# Patient Record
Sex: MALE | Race: WHITE | Employment: OTHER | URBAN - METROPOLITAN AREA
[De-identification: names, ages, dates, MRNs, and addresses within clinical notes are randomized per-mention and may not be internally consistent; named-entity substitution may affect disease eponyms.]

---

## 2018-10-15 ENCOUNTER — OFFICE VISIT (OUTPATIENT)
Dept: INTERNAL MEDICINE CLINIC | Age: 58
End: 2018-10-15
Payer: COMMERCIAL

## 2018-10-15 VITALS
WEIGHT: 198 LBS | DIASTOLIC BLOOD PRESSURE: 66 MMHG | BODY MASS INDEX: 26.82 KG/M2 | HEART RATE: 86 BPM | OXYGEN SATURATION: 100 % | SYSTOLIC BLOOD PRESSURE: 118 MMHG | RESPIRATION RATE: 16 BRPM | TEMPERATURE: 98.1 F | HEIGHT: 72 IN

## 2018-10-15 DIAGNOSIS — F17.200 SMOKER: ICD-10-CM

## 2018-10-15 DIAGNOSIS — Z00.00 HEALTH CARE MAINTENANCE: Primary | ICD-10-CM

## 2018-10-15 DIAGNOSIS — Z80.0 FHX: COLON CANCER: ICD-10-CM

## 2018-10-15 DIAGNOSIS — D23.10 PAPILLOMA OF LEFT EYELID: ICD-10-CM

## 2018-10-15 PROCEDURE — 99204 OFFICE O/P NEW MOD 45 MIN: CPT | Performed by: INTERNAL MEDICINE

## 2018-10-15 PROCEDURE — G8484 FLU IMMUNIZE NO ADMIN: HCPCS | Performed by: INTERNAL MEDICINE

## 2018-10-15 PROCEDURE — 3017F COLORECTAL CA SCREEN DOC REV: CPT | Performed by: INTERNAL MEDICINE

## 2018-10-15 PROCEDURE — G8427 DOCREV CUR MEDS BY ELIG CLIN: HCPCS | Performed by: INTERNAL MEDICINE

## 2018-10-15 PROCEDURE — 4004F PT TOBACCO SCREEN RCVD TLK: CPT | Performed by: INTERNAL MEDICINE

## 2018-10-15 PROCEDURE — G8419 CALC BMI OUT NRM PARAM NOF/U: HCPCS | Performed by: INTERNAL MEDICINE

## 2018-10-15 RX ORDER — VARENICLINE TARTRATE 25 MG
KIT ORAL
Qty: 1 EACH | Refills: 1 | Status: SHIPPED | OUTPATIENT
Start: 2018-10-15 | End: 2021-09-29

## 2018-10-15 RX ORDER — VARENICLINE TARTRATE 1 MG/1
1 TABLET, FILM COATED ORAL 2 TIMES DAILY
Qty: 60 TABLET | Refills: 3 | Status: SHIPPED | OUTPATIENT
Start: 2018-11-15 | End: 2019-04-24 | Stop reason: SDUPTHER

## 2018-10-15 ASSESSMENT — ENCOUNTER SYMPTOMS
BACK PAIN: 0
CONSTIPATION: 0
FACIAL SWELLING: 0
WHEEZING: 0
COUGH: 0
ABDOMINAL DISTENTION: 0
COLOR CHANGE: 0
CHEST TIGHTNESS: 0
APNEA: 0
SHORTNESS OF BREATH: 0
DIARRHEA: 0
ABDOMINAL PAIN: 0

## 2018-10-15 ASSESSMENT — PATIENT HEALTH QUESTIONNAIRE - PHQ9
SUM OF ALL RESPONSES TO PHQ QUESTIONS 1-9: 0
1. LITTLE INTEREST OR PLEASURE IN DOING THINGS: 0
2. FEELING DOWN, DEPRESSED OR HOPELESS: 0
SUM OF ALL RESPONSES TO PHQ9 QUESTIONS 1 & 2: 0
SUM OF ALL RESPONSES TO PHQ QUESTIONS 1-9: 0

## 2018-12-18 ENCOUNTER — OFFICE VISIT (OUTPATIENT)
Dept: INTERNAL MEDICINE CLINIC | Age: 58
End: 2018-12-18
Payer: COMMERCIAL

## 2018-12-18 VITALS
BODY MASS INDEX: 28.31 KG/M2 | HEIGHT: 72 IN | DIASTOLIC BLOOD PRESSURE: 70 MMHG | SYSTOLIC BLOOD PRESSURE: 130 MMHG | WEIGHT: 209 LBS

## 2018-12-18 DIAGNOSIS — F17.200 SMOKER: Primary | ICD-10-CM

## 2018-12-18 PROCEDURE — 99213 OFFICE O/P EST LOW 20 MIN: CPT | Performed by: INTERNAL MEDICINE

## 2018-12-18 PROCEDURE — G8419 CALC BMI OUT NRM PARAM NOF/U: HCPCS | Performed by: INTERNAL MEDICINE

## 2018-12-18 PROCEDURE — G8427 DOCREV CUR MEDS BY ELIG CLIN: HCPCS | Performed by: INTERNAL MEDICINE

## 2018-12-18 PROCEDURE — 3017F COLORECTAL CA SCREEN DOC REV: CPT | Performed by: INTERNAL MEDICINE

## 2018-12-18 PROCEDURE — 4004F PT TOBACCO SCREEN RCVD TLK: CPT | Performed by: INTERNAL MEDICINE

## 2018-12-18 PROCEDURE — G8484 FLU IMMUNIZE NO ADMIN: HCPCS | Performed by: INTERNAL MEDICINE

## 2018-12-18 NOTE — PROGRESS NOTES
Constitutional: Negative for activity change, appetite change, chills and diaphoresis. HENT: Negative for congestion, dental problem, ear discharge, facial swelling and hearing loss. Respiratory: Negative for apnea, cough, chest tightness, shortness of breath and wheezing. Cardiovascular: Negative for chest pain and leg swelling. Gastrointestinal: Negative for abdominal distention, abdominal pain, constipation and diarrhea. Genitourinary: Negative for difficulty urinating, dysuria, enuresis, flank pain and frequency. Musculoskeletal: Negative for arthralgias, back pain, gait problem and joint swelling. Skin: Negative for color change, pallor and rash. Mobile Swelling in the upper eyelid of left eye   Neurological: Negative for dizziness, seizures, facial asymmetry, light-headedness, numbness and headaches. Psychiatric/Behavioral: Negative for agitation, behavioral problems, confusion, decreased concentration and dysphoric mood. Objective:   Physical Exam   Constitutional: He is oriented to person, place, and time. He appears well-developed and well-nourished. HENT:   Head: Normocephalic and atraumatic. Mouth/Throat: No oropharyngeal exudate. Eyes: Pupils are equal, round, and reactive to light. Conjunctivae are normal. Right eye exhibits no discharge. Left eye exhibits no discharge. No scleral icterus. Papilloma present in the upper eyelid of left eye   Neck: Normal range of motion. Neck supple. No JVD present. No tracheal deviation present. No thyromegaly present. Cardiovascular: Normal rate and normal heart sounds. Exam reveals no gallop. No murmur heard. Pulmonary/Chest: Effort normal and breath sounds normal. No stridor. No respiratory distress. He has no wheezes. He has no rales. He exhibits no tenderness. Abdominal: Soft. Bowel sounds are normal. He exhibits no distension. There is no tenderness. There is no rebound and no guarding.    Musculoskeletal: Normal

## 2018-12-29 ASSESSMENT — ENCOUNTER SYMPTOMS
BACK PAIN: 0
APNEA: 0
ABDOMINAL PAIN: 0
FACIAL SWELLING: 0
ABDOMINAL DISTENTION: 0
CHEST TIGHTNESS: 0
COLOR CHANGE: 0
WHEEZING: 0
SHORTNESS OF BREATH: 0
CONSTIPATION: 0
DIARRHEA: 0
COUGH: 0

## 2019-04-24 DIAGNOSIS — F17.200 SMOKER: ICD-10-CM

## 2019-04-25 RX ORDER — VARENICLINE TARTRATE 1 MG/1
TABLET, FILM COATED ORAL
Qty: 60 TABLET | Refills: 2 | Status: SHIPPED | OUTPATIENT
Start: 2019-04-25 | End: 2021-09-29

## 2021-07-24 ENCOUNTER — APPOINTMENT (OUTPATIENT)
Dept: GENERAL RADIOLOGY | Age: 61
End: 2021-07-24
Payer: COMMERCIAL

## 2021-07-24 ENCOUNTER — HOSPITAL ENCOUNTER (EMERGENCY)
Age: 61
Discharge: HOME | End: 2021-07-24
Attending: EMERGENCY MEDICINE
Payer: COMMERCIAL

## 2021-07-24 VITALS
TEMPERATURE: 96.7 F | HEART RATE: 83 BPM | SYSTOLIC BLOOD PRESSURE: 152 MMHG | RESPIRATION RATE: 18 BRPM | HEIGHT: 72 IN | WEIGHT: 195 LBS | DIASTOLIC BLOOD PRESSURE: 75 MMHG | OXYGEN SATURATION: 97 % | BODY MASS INDEX: 26.41 KG/M2

## 2021-07-24 DIAGNOSIS — M25.551 RIGHT HIP PAIN: Primary | ICD-10-CM

## 2021-07-24 DIAGNOSIS — M54.31 SCIATICA OF RIGHT SIDE: ICD-10-CM

## 2021-07-24 PROCEDURE — 72100 X-RAY EXAM L-S SPINE 2/3 VWS: CPT

## 2021-07-24 PROCEDURE — 73502 X-RAY EXAM HIP UNI 2-3 VIEWS: CPT

## 2021-07-24 PROCEDURE — 6370000000 HC RX 637 (ALT 250 FOR IP): Performed by: EMERGENCY MEDICINE

## 2021-07-24 PROCEDURE — 99284 EMERGENCY DEPT VISIT MOD MDM: CPT

## 2021-07-24 RX ORDER — LIDOCAINE 50 MG/G
1 PATCH TOPICAL DAILY
Qty: 10 PATCH | Refills: 0 | Status: SHIPPED | OUTPATIENT
Start: 2021-07-24 | End: 2021-09-29

## 2021-07-24 RX ORDER — NAPROXEN 500 MG/1
500 TABLET ORAL 2 TIMES DAILY
Qty: 20 TABLET | Refills: 0 | Status: SHIPPED | OUTPATIENT
Start: 2021-07-24 | End: 2021-09-29

## 2021-07-24 RX ORDER — NAPROXEN 250 MG/1
500 TABLET ORAL ONCE
Status: COMPLETED | OUTPATIENT
Start: 2021-07-24 | End: 2021-07-24

## 2021-07-24 RX ORDER — CYCLOBENZAPRINE HCL 10 MG
10 TABLET ORAL 3 TIMES DAILY PRN
Qty: 30 TABLET | Refills: 0 | Status: SHIPPED | OUTPATIENT
Start: 2021-07-24 | End: 2021-08-03

## 2021-07-24 RX ORDER — ACETAMINOPHEN 500 MG
1000 TABLET ORAL EVERY 6 HOURS PRN
Qty: 60 TABLET | Refills: 0 | Status: SHIPPED | OUTPATIENT
Start: 2021-07-24 | End: 2021-10-19 | Stop reason: ALTCHOICE

## 2021-07-24 RX ORDER — ACETAMINOPHEN 500 MG
1000 TABLET ORAL ONCE
Status: COMPLETED | OUTPATIENT
Start: 2021-07-24 | End: 2021-07-24

## 2021-07-24 RX ORDER — LIDOCAINE 4 G/G
1 PATCH TOPICAL ONCE
Status: DISCONTINUED | OUTPATIENT
Start: 2021-07-24 | End: 2021-07-24 | Stop reason: HOSPADM

## 2021-07-24 RX ADMIN — NAPROXEN 500 MG: 250 TABLET ORAL at 13:23

## 2021-07-24 RX ADMIN — ACETAMINOPHEN 1000 MG: 500 TABLET ORAL at 13:23

## 2021-07-24 ASSESSMENT — ENCOUNTER SYMPTOMS
ABDOMINAL PAIN: 0
BACK PAIN: 1
BOWEL INCONTINENCE: 0

## 2021-07-24 ASSESSMENT — PAIN SCALES - GENERAL
PAINLEVEL_OUTOF10: 8
PAINLEVEL_OUTOF10: 8

## 2021-07-24 NOTE — ED NOTES
Pt given instructions for follow-up and discharge. Pt given education on prescriptions. Pt verbalizes understanding. Pt is A&O x4, PWD, eupneic, and ambulatory with steady, even gait upon discharge.       Giovana Pineda RN  07/24/21 1158

## 2021-07-24 NOTE — ED PROVIDER NOTES
EMERGENCY DEPARTMENT ENCOUNTER    Pt Name: Naun Briscoe  MRN: [id number]  [place] 1960  Date of evaluation: 7/24/21  CHIEF COMPLAINT       Chief Complaint   Patient presents with    Hip Pain     right     HISTORY OF PRESENT ILLNESS     Back Pain  Pain location: right low back, right hip, radiates down back of leg. Quality:  Aching  Radiates to:  R posterior upper leg and R thigh  Pain severity:  Severe  Onset quality:  Gradual  Duration:  3 days  Timing:  Constant  Progression:  Unchanged  Chronicity:  New  Context comment:  Does paint and wallpaper for work, his own business  Relieved by: standing helps. Worsened by:  Nothing  Ineffective treatments: motrin, [place]. Associated symptoms: no abdominal pain, no bladder incontinence, no bowel incontinence, no fever, no numbness, no paresthesias, no tingling, no weakness and no weight loss            REVIEW OF SYSTEMS     Review of Systems   Constitutional: Negative for fever and weight loss. Gastrointestinal: Negative for abdominal pain and bowel incontinence. Genitourinary: Negative for bladder incontinence. Musculoskeletal: Positive for back pain. Neurological: Negative for tingling, weakness, numbness and paresthesias. All other systems reviewed and are negative. PASTMEDICAL HISTORY   History reviewed. No pertinent past medical history.   Past Problem List  Patient Active Problem List   Diagnosis Code    Smoker F17.200    FHx: colon cancer Z80.0     SURGICAL HISTORY       Past Surgical History:   Procedure Laterality Date    HERNIA REPAIR      TONSILLECTOMY       CURRENT MEDICATIONS       Discharge Medication List as of 7/24/2021  1:45 PM      CONTINUE these medications which have NOT CHANGED    Details   CHANTIX 1 MG tablet TAKE ONE TABLET BY MOUTH TWICE A DAY, Disp-60 tablet, R-2Normal      varenicline (CHANTIX STARTING MONTH PAK) 0.5 MG X 11 & 1 MG X 42 tablet Take by mouth., Disp-1 each, R-1Normal           ALLERGIES     has No Known Allergies. FAMILY HISTORY     He indicated that his mother is . He indicated that his father is . He indicated that the status of his maternal uncle is unknown. SOCIAL HISTORY       Social History     Tobacco Use    Smoking status: Current Every Day Smoker     Packs/day: 1.50     Years: 30.00     Pack years: 45.00     Types: Cigarettes    Smokeless tobacco: Never Used   Vaping Use    Vaping Use: Never used   Substance Use Topics    Alcohol use: Yes     Alcohol/week: 1.0 standard drinks     Types: 1 Cans of beer per week     Comment: 1 daily     Drug use: No     PHYSICAL EXAM     INITIAL VITALS: BP (!) 152/75   Pulse 83   Temp 96.7 °F (35.9 °C) (Temporal)   Resp 18   Ht 6' (1.829 m)   Wt 195 lb (88.5 kg)   SpO2 97%   BMI 26.45 kg/m²    Physical Exam  Vitals reviewed. Constitutional:       General: He is not in acute distress. Appearance: Normal appearance. He is well-developed. He is not diaphoretic. HENT:      Head: Normocephalic and atraumatic. Right Ear: External ear normal.      Left Ear: External ear normal.      Nose: Nose normal. No congestion. Mouth/Throat:      Mouth: Mucous membranes are moist.      Pharynx: Oropharynx is clear. Eyes:      General:         Right eye: No discharge. Left eye: No discharge. Conjunctiva/sclera: Conjunctivae normal.      Pupils: Pupils are equal, round, and reactive to light. Neck:      Trachea: No tracheal deviation. Cardiovascular:      Rate and Rhythm: Normal rate and regular rhythm. Pulses: Normal pulses. Heart sounds: Normal heart sounds. Pulmonary:      Effort: Pulmonary effort is normal. No respiratory distress. Breath sounds: Normal breath sounds. No stridor. No wheezing or rales. Abdominal:      Palpations: Abdomen is soft. Tenderness: There is no abdominal tenderness. There is no guarding or rebound. Musculoskeletal:         General: No tenderness or deformity.  Normal range of motion. Cervical back: Normal range of motion and neck supple. Comments: No midline t or l spine tenderness  Full rom in right hip and right knee  No erythema or edema or warmth to right hip or right knee   Skin:     General: Skin is warm and dry. Capillary Refill: Capillary refill takes less than 2 seconds. Findings: No erythema or rash. Neurological:      General: No focal deficit present. Mental Status: He is alert and oriented to person, place, and time. Cranial Nerves: No cranial nerve deficit. Coordination: Coordination normal.      Comments: GCS 15  Strength in arms 5/5  Strength in legs 5/5  Sensation intact bl arms and legs  No facial droop  Speech is clear, no dysarthria or aphasia  No ataxia in arms or legs  No gaze preference or nystagums  Normal gait in the ED     Psychiatric:         Mood and Affect: Mood normal.         Behavior: Behavior normal.         Thought Content: Thought content normal.         Judgment: Judgment normal.         MEDICAL DECISION MAKING:   Reviewed xrays  He is ambulatory  Feeling better  Do not suspect cord compression or infection  Discussed with patient anticipatory guidance, discharge instructions, follow up PCP 24 hours  Do not suspect septic hip           Procedures    DIAGNOSTIC RESULTS     RADIOLOGY:All plain film, CT, MRI, and formal ultrasound images (except ED bedside ultrasound) are read by the radiologist, see reports below, unless otherwisenoted in MDM or here. XR LUMBAR SPINE (2-3 VIEWS)   Final Result   Multilevel degenerative disc disease in the mid and lower lumbar spine and   lower lumbar facet arthropathy. XR HIP 2-3 VW W PELVIS RIGHT   Final Result   Mild degenerative changes. No acute osseous abnormality.            EMERGENCY DEPARTMENTCOURSE:         Vitals:    Vitals:    07/24/21 1215   BP: (!) 152/75   Pulse: 83   Resp: 18   Temp: 96.7 °F (35.9 °C)   TempSrc: Temporal   SpO2: 97%   Weight: 195 lb (88.5 kg)   Height: 6' (1.829 m)       The patient was given the following medications while in the emergency department:  Orders Placed This Encounter   Medications    acetaminophen (TYLENOL) tablet 1,000 mg    DISCONTD: lidocaine 4 % external patch 1 patch    naproxen (NAPROSYN) tablet 500 mg    acetaminophen (TYLENOL) 500 MG tablet     Sig: Take 2 tablets by mouth every 6 hours as needed for Pain     Dispense:  60 tablet     Refill:  0    naproxen (NAPROSYN) 500 MG tablet     Sig: Take 1 tablet by mouth 2 times daily     Dispense:  20 tablet     Refill:  0    cyclobenzaprine (FLEXERIL) 10 MG tablet     Sig: Take 1 tablet by mouth 3 times daily as needed for Muscle spasms     Dispense:  30 tablet     Refill:  0    lidocaine (LIDODERM) 5 %     Sig: Place 1 patch onto the skin daily 12 hours on, 12 hours off. Dispense:  10 patch     Refill:  0       FINAL IMPRESSION      1. Right hip pain    2.  Sciatica of right side          DISPOSITION/PLAN   DISPOSITION Decision To Discharge 07/24/2021 01:44:59 PM      PATIENT REFERRED TO:  Kirsten Hollins MD  [address]  [phone]          DISCHARGE MEDICATIONS:  Discharge Medication List as of 7/24/2021  1:45 PM      START taking these medications    Details   acetaminophen (TYLENOL) 500 MG tablet Take 2 tablets by mouth every 6 hours as needed for Pain, Disp-60 tablet, R-0Normal      naproxen (NAPROSYN) 500 MG tablet Take 1 tablet by mouth 2 times daily, Disp-20 tablet, R-0Normal      cyclobenzaprine (FLEXERIL) 10 MG tablet Take 1 tablet by mouth 3 times daily as needed for Muscle spasms, Disp-30 tablet, R-0Normal      lidocaine (LIDODERM) 5 % Place 1 patch onto the skin daily 12 hours on, 12 hours off., Disp-10 patch, R-0Normal           Kingsley Childers MD  Attending Emergency Physician                   Kingsley Childers MD  07/24/21 9345

## 2021-07-27 ENCOUNTER — TELEPHONE (OUTPATIENT)
Dept: INTERNAL MEDICINE CLINIC | Age: 61
End: 2021-07-27

## 2021-07-27 NOTE — TELEPHONE ENCOUNTER
----- Message from Valerio Baltazargilda sent at 7/27/2021 12:49 PM EDT -----  Subject: Appointment Request    Reason for Call: Routine ED Follow Up Visit    QUESTIONS  Type of Appointment? Established Patient  Reason for appointment request? Available appointments did not meet   patient need  Additional Information for Provider? Was In ER 7/24 with hip pain still   having pain and travel to [place] Can he please have a soon appointment   ---------------------------------------------------------------------------  --------------  [employer]  What is the best way for the office to contact you? OK to leave message on   voicemail  Preferred Call Back Phone Number? [phone]  ---------------------------------------------------------------------------  --------------  SCRIPT ANSWERS  Relationship to Patient? Self  (Patient requests to see provider urgently. )? No  Do you have any questions for your primary care provider that need to be   answered prior to your appointment? No  Have you been diagnosed with, awaiting test results for, or told that you   are suspected of having COVID-19 (Coronavirus)? (If patient has tested   negative or was tested as a requirement for work, school, or travel and   not based on symptoms, answer no)? No  Do you currently have flu-like symptoms including fever or chills, cough,   shortness of breath, difficulty breathing, or new loss of taste or smell? No  Have you had close contact with someone with COVID-19 in the last 14 days? No  (Service Expert  click yes below to proceed with [employer] Usual   Scheduling)?  Yes

## 2021-07-28 ENCOUNTER — OFFICE VISIT (OUTPATIENT)
Dept: INTERNAL MEDICINE CLINIC | Age: 61
End: 2021-07-28
Payer: COMMERCIAL

## 2021-07-28 VITALS
HEIGHT: 72 IN | SYSTOLIC BLOOD PRESSURE: 136 MMHG | WEIGHT: 195 LBS | DIASTOLIC BLOOD PRESSURE: 84 MMHG | BODY MASS INDEX: 26.41 KG/M2

## 2021-07-28 DIAGNOSIS — H61.23 BILATERAL IMPACTED CERUMEN: ICD-10-CM

## 2021-07-28 DIAGNOSIS — Z13.220 SCREENING FOR HYPERLIPIDEMIA: ICD-10-CM

## 2021-07-28 DIAGNOSIS — M25.551 RIGHT HIP PAIN: Primary | ICD-10-CM

## 2021-07-28 DIAGNOSIS — Z13.1 ENCOUNTER FOR SCREENING FOR DIABETES MELLITUS: ICD-10-CM

## 2021-07-28 PROCEDURE — G8427 DOCREV CUR MEDS BY ELIG CLIN: HCPCS | Performed by: INTERNAL MEDICINE

## 2021-07-28 PROCEDURE — G8419 CALC BMI OUT NRM PARAM NOF/U: HCPCS | Performed by: INTERNAL MEDICINE

## 2021-07-28 PROCEDURE — 4004F PT TOBACCO SCREEN RCVD TLK: CPT | Performed by: INTERNAL MEDICINE

## 2021-07-28 PROCEDURE — 99214 OFFICE O/P EST MOD 30 MIN: CPT | Performed by: INTERNAL MEDICINE

## 2021-07-28 PROCEDURE — 3017F COLORECTAL CA SCREEN DOC REV: CPT | Performed by: INTERNAL MEDICINE

## 2021-07-28 RX ORDER — HYDROCODONE BITARTRATE AND ACETAMINOPHEN 5; 325 MG/1; MG/1
1 TABLET ORAL EVERY 6 HOURS PRN
Qty: 20 TABLET | Refills: 0 | Status: SHIPPED | OUTPATIENT
Start: 2021-07-28 | End: 2021-08-02

## 2021-07-28 SDOH — ECONOMIC STABILITY: FOOD INSECURITY: WITHIN THE PAST 12 MONTHS, THE FOOD YOU BOUGHT JUST DIDN'T LAST AND YOU DIDN'T HAVE MONEY TO GET MORE.: NEVER TRUE

## 2021-07-28 SDOH — ECONOMIC STABILITY: FOOD INSECURITY: WITHIN THE PAST 12 MONTHS, YOU WORRIED THAT YOUR FOOD WOULD RUN OUT BEFORE YOU GOT MONEY TO BUY MORE.: NEVER TRUE

## 2021-07-28 ASSESSMENT — PATIENT HEALTH QUESTIONNAIRE - PHQ9
2. FEELING DOWN, DEPRESSED OR HOPELESS: 0
1. LITTLE INTEREST OR PLEASURE IN DOING THINGS: 0
SUM OF ALL RESPONSES TO PHQ QUESTIONS 1-9: 0
SUM OF ALL RESPONSES TO PHQ QUESTIONS 1-9: 0
SUM OF ALL RESPONSES TO PHQ9 QUESTIONS 1 & 2: 0
SUM OF ALL RESPONSES TO PHQ QUESTIONS 1-9: 0

## 2021-07-28 ASSESSMENT — ENCOUNTER SYMPTOMS
APNEA: 0
COLOR CHANGE: 0
BACK PAIN: 0
CHEST TIGHTNESS: 0
DIARRHEA: 0
WHEEZING: 0
ABDOMINAL PAIN: 0
CONSTIPATION: 0
ABDOMINAL DISTENTION: 0
COUGH: 0
FACIAL SWELLING: 0
SHORTNESS OF BREATH: 0

## 2021-07-28 ASSESSMENT — SOCIAL DETERMINANTS OF HEALTH (SDOH): HOW HARD IS IT FOR YOU TO PAY FOR THE VERY BASICS LIKE FOOD, HOUSING, MEDICAL CARE, AND HEATING?: HARD

## 2021-07-28 NOTE — PROGRESS NOTES
Subjective:      Patient ID: Avril Tan is a 63 y.o. male. HPI       HPI   1) Location/Symptom - Right hip  pain  2) Timing/Onset: 7  days   3) Context/Setting:No injury/trauma /fall   4) Quality: Dull aching   5) Duration: continuous   6) Modifying Factors:Walking    7) Severity: moderate   Patient was in emergency recently with similar complaints, underwent x-ray lower back and x-ray right hip which is distal mild degenerative changes, patient was prescribed naproxen which has helped him partially  Patient is complaining of hearing problems affecting both ears. No discharge from ears, no ear pain. Review of Systems   Constitutional: Negative for activity change, appetite change, chills and diaphoresis. HENT: Positive for hearing loss (Both ears). Negative for congestion, dental problem, ear discharge and facial swelling. Respiratory: Negative for apnea, cough, chest tightness, shortness of breath and wheezing. Cardiovascular: Negative for chest pain and leg swelling. Gastrointestinal: Negative for abdominal distention, abdominal pain, constipation and diarrhea. Genitourinary: Negative for difficulty urinating, dysuria, enuresis, flank pain and frequency. Musculoskeletal: Positive for arthralgias (Right hip pain). Negative for back pain, gait problem and joint swelling. Skin: Negative for color change, pallor and rash. Neurological: Negative for dizziness, seizures, facial asymmetry, light-headedness, numbness and headaches. Psychiatric/Behavioral: Negative for agitation, behavioral problems, confusion, decreased concentration and dysphoric mood. Objective:   Physical Exam  Vitals and nursing note reviewed. Constitutional:       General: He is not in acute distress. Appearance: He is well-developed. He is not diaphoretic. HENT:      Head: Normocephalic and atraumatic. Comments: Cerumen present both ears     Mouth/Throat:      Pharynx: No oropharyngeal exudate. Eyes:      General: No scleral icterus. Right eye: No discharge. Left eye: No discharge. Conjunctiva/sclera: Conjunctivae normal.      Pupils: Pupils are equal, round, and reactive to light. Neck:      Thyroid: No thyromegaly. Vascular: No JVD. Trachea: No tracheal deviation. Cardiovascular:      Rate and Rhythm: Normal rate. Heart sounds: Normal heart sounds. No murmur heard. No gallop. Pulmonary:      Effort: Pulmonary effort is normal. No respiratory distress. Breath sounds: Normal breath sounds. No stridor. No wheezing or rales. Abdominal:      General: Bowel sounds are normal. There is no distension. Palpations: Abdomen is soft. Tenderness: There is no abdominal tenderness. There is no guarding or rebound. Musculoskeletal:         General: No tenderness. Normal range of motion. Cervical back: Normal range of motion and neck supple. Comments: Negative CHAD test on right side. Skin:     General: Skin is warm and dry. Findings: No erythema or rash. Neurological:      Mental Status: He is alert and oriented to person, place, and time. Assessment / Plan:   1. Screening for hyperlipidemia    - Lipid, Fasting; Future    2. Encounter for screening for diabetes mellitus    - Glucose, Fasting; Future    3. Right hip pain    - HYDROcodone-acetaminophen (NORCO) 5-325 MG per tablet; Take 1 tablet by mouth every 6 hours as needed for Pain for up to 5 days. Intended supply: 30 days  Dispense: 20 tablet; Refill: 0  - [address]    4. Bilateral impacted cerumen    - carbamide peroxide (DEBROX) 6.5 % otic solution; Place 5 drops in ear(s) 2 times daily  Dispense: 1 Bottle; Refill: 0      · Return in about 6 weeks (around 9/8/2021). · Reviewed prior labs and health maintenance. · Discussed use, benefit, and side effects of prescribed medications. Barriers to medication compliance addressed.   All patient questions answered. Pt voiced understanding. Zbigniew Oconnor MD  [facility]  7/28/2021, 6:04 PM    Please note that this chart was generated using voice recognition Dragon dictation software. Although every effort was made to ensure the accuracy of this automated transcription, some errors in transcription may have occurred. Visit Information    Have you changed or started any medications since your last visit including any over-the-counter medicines, vitamins, or herbal medicines? no   Are you having any side effects from any of your medications? -  no  Have you stopped taking any of your medications? Is so, why? -  no    Have you seen any other physician or provider since your last visit? Yes - Records Requested  Have you had any other diagnostic tests since your last visit? Yes - Records Requested  Have you been seen in the emergency room and/or had an admission to a hospital since we last saw you? Yes - Records Requested  Have you had your routine dental cleaning in the past 6 months? no    Have you activated your [employer] account? If not, what are your barriers?  No: inactive     Patient Care Team:  Zbigniew Oconnor MD as PCP - General (Internal Medicine)  Zbigniew Oconnor MD as PCP - [facility]    Medical History Review  Past Medical, Family, and Social History reviewed and does not contribute to the patient presenting condition    Health Maintenance   Topic Date Due    Hepatitis C screen  Never done    Pneumococcal 0-64 years Vaccine (1 of 2 - PPSV23) Never done    COVID-19 Vaccine (1) Never done    HIV screen  Never done    DTaP/Tdap/Td vaccine (1 - Tdap) Never done    Lipid screen  Never done    Diabetes screen  Never done    Colon cancer screen colonoscopy  Never done    Shingles Vaccine (1 of 2) Never done    Low dose CT lung screening  Never done    Flu vaccine (1) 09/01/2021    Hepatitis A vaccine  Aged Out    Hepatitis B vaccine  Aged Out    Hib vaccine Aged Out    Meningococcal (ACWY) vaccine  Aged Out

## 2021-07-29 ENCOUNTER — APPOINTMENT (OUTPATIENT)
Dept: GENERAL RADIOLOGY | Age: 61
End: 2021-07-29
Payer: COMMERCIAL

## 2021-07-29 ENCOUNTER — HOSPITAL ENCOUNTER (EMERGENCY)
Age: 61
Discharge: HOME | End: 2021-07-29
Attending: EMERGENCY MEDICINE
Payer: COMMERCIAL

## 2021-07-29 VITALS
SYSTOLIC BLOOD PRESSURE: 150 MMHG | RESPIRATION RATE: 18 BRPM | HEIGHT: 72 IN | DIASTOLIC BLOOD PRESSURE: 91 MMHG | HEART RATE: 110 BPM | WEIGHT: 195 LBS | BODY MASS INDEX: 26.41 KG/M2 | TEMPERATURE: 97.8 F | OXYGEN SATURATION: 97 %

## 2021-07-29 DIAGNOSIS — M54.31 SCIATICA OF RIGHT SIDE: Primary | ICD-10-CM

## 2021-07-29 DIAGNOSIS — M62.838 SPASM OF MUSCLE: ICD-10-CM

## 2021-07-29 PROCEDURE — 73562 X-RAY EXAM OF KNEE 3: CPT

## 2021-07-29 PROCEDURE — 72170 X-RAY EXAM OF PELVIS: CPT

## 2021-07-29 PROCEDURE — 96372 THER/PROPH/DIAG INJ SC/IM: CPT

## 2021-07-29 PROCEDURE — 99282 EMERGENCY DEPT VISIT SF MDM: CPT

## 2021-07-29 PROCEDURE — 6360000002 HC RX W HCPCS: Performed by: PHYSICIAN ASSISTANT

## 2021-07-29 RX ORDER — DIAZEPAM 5 MG/1
5 TABLET ORAL EVERY 6 HOURS PRN
Qty: 6 TABLET | Refills: 0 | Status: SHIPPED | OUTPATIENT
Start: 2021-07-29 | End: 2021-08-01

## 2021-07-29 RX ORDER — DEXAMETHASONE SODIUM PHOSPHATE 10 MG/ML
10 INJECTION, SOLUTION INTRAMUSCULAR; INTRAVENOUS ONCE
Status: COMPLETED | OUTPATIENT
Start: 2021-07-29 | End: 2021-07-29

## 2021-07-29 RX ORDER — KETOROLAC TROMETHAMINE 30 MG/ML
30 INJECTION, SOLUTION INTRAMUSCULAR; INTRAVENOUS ONCE
Status: COMPLETED | OUTPATIENT
Start: 2021-07-29 | End: 2021-07-29

## 2021-07-29 RX ORDER — DIAZEPAM 5 MG/ML
5 INJECTION, SOLUTION INTRAMUSCULAR; INTRAVENOUS ONCE
Status: COMPLETED | OUTPATIENT
Start: 2021-07-29 | End: 2021-07-29

## 2021-07-29 RX ORDER — PREDNISONE 20 MG/1
20 TABLET ORAL DAILY
Qty: 5 TABLET | Refills: 0 | Status: SHIPPED | OUTPATIENT
Start: 2021-07-30 | End: 2021-08-04

## 2021-07-29 RX ADMIN — KETOROLAC TROMETHAMINE 30 MG: 30 INJECTION, SOLUTION INTRAMUSCULAR; INTRAVENOUS at 13:20

## 2021-07-29 RX ADMIN — DIAZEPAM 5 MG: 5 INJECTION, SOLUTION INTRAMUSCULAR; INTRAVENOUS at 14:21

## 2021-07-29 RX ADMIN — DEXAMETHASONE SODIUM PHOSPHATE 10 MG: 10 INJECTION, SOLUTION INTRAMUSCULAR; INTRAVENOUS at 13:20

## 2021-07-29 ASSESSMENT — PAIN DESCRIPTION - FREQUENCY: FREQUENCY: CONTINUOUS

## 2021-07-29 ASSESSMENT — PAIN DESCRIPTION - ORIENTATION: ORIENTATION: RIGHT;LOWER

## 2021-07-29 ASSESSMENT — PAIN DESCRIPTION - DESCRIPTORS: DESCRIPTORS: DISCOMFORT

## 2021-07-29 ASSESSMENT — PAIN SCALES - GENERAL: PAINLEVEL_OUTOF10: 9

## 2021-07-29 ASSESSMENT — PAIN DESCRIPTION - LOCATION: LOCATION: BACK

## 2021-07-29 ASSESSMENT — PAIN DESCRIPTION - PAIN TYPE: TYPE: ACUTE PAIN

## 2021-07-29 NOTE — ED PROVIDER NOTES
[address] ED  Emergency Department  Independent Attestation     Pt Name: Samuel Ruelas  MRN: [id number]  [place] 1960  Date of evaluation: 7/29/21       Samuel Ruelas is a 63 y.o. male who presents with Back Pain (Pt states R lower back pain, R hip pain, radiating pain down R leg. Pt states recently seen in ER and by MD and unable to get pain under control. )      I was personally available for consultation in the Emergency Department.     Leanna Cope DO  Attending Emergency Physician  [address] ED      (Please note that portions of this note were completed with a voice recognition program.  Efforts were made to edit the dictations but occasionally words are mis-transcribed.)        Leanna Cope DO  07/29/21 5082

## 2021-07-29 NOTE — ED PROVIDER NOTES
[address]  eMERGENCY dEPARTMENT eNCOUnter      Pt Name: Avril Tan  MRN: [id number]  [place] 1960  Date of evaluation: 7/29/2021  Provider: Koko Still PA-C    CHIEF COMPLAINT       Chief Complaint   Patient presents with    Back Pain     Pt states R lower back pain, R hip pain, radiating pain down R leg. Pt states recently seen in ER and by MD and unable to get pain under control. HISTORY OF PRESENT ILLNESS  (Location/Symptom, Timing/Onset, Context/Setting, Quality, Duration, Modifying Factors, Severity.)   Avril Tan is a 63 y.o. male who presents to the emergency department for evaluation of right leg pain. Pt states 1 week ago he develop pain in his right buttock. States the pain is radiating into the lateral and medial aspect of thigh and into knee. Pain is 9/10 burning/ fire pain. States he will get spasms. Pain is worse with laying, sitting, walking. Denies any numbness, tingling, loss of bowel or bladder control, urinary retention, abd pain, nausea, emesis, chest pain, sob, fevers. Pt denies injury. States he was seen in our ED on 7/24; Had negative xrays of right hip and lumbar spine. Pt was given naproxen, tylenol, flexeril, lidoderm. States it will alleviate pain for short period of time. Pt did see his PCP yesterday. Was ordered PT and given norco.  Pt denies hx of diabetes. No hx of IV drug use. Pt states he has pain like this years ago. No other complaints. Nursing Notes were reviewed. REVIEW OF SYSTEMS    (2-9 systems for level 4, 10 or more for level 5)     Review of Systems   Constitutional: Negative. Respiratory: Negative. Cardiovascular: Negative. Gastrointestinal: Negative. Musculoskeletal: Complaining of leg pain  Genitourinary: Negative. Skin: Negative. Neurological: Negative. Except as noted above the remainder of the review of systems was reviewed and negative. PAST MEDICAL HISTORY   History reviewed.  No pertinent past medical history. None otherwise stated in nurses notes    SURGICAL HISTORY           Procedure Laterality Date    HERNIA REPAIR      TONSILLECTOMY       None otherwise stated in nurses notes    CURRENT MEDICATIONS       Previous Medications    ACETAMINOPHEN (TYLENOL) 500 MG TABLET    Take 2 tablets by mouth every 6 hours as needed for Pain    CARBAMIDE PEROXIDE (DEBROX) 6.5 % OTIC SOLUTION    Place 5 drops in ear(s) 2 times daily    CHANTIX 1 MG TABLET    TAKE ONE TABLET BY MOUTH TWICE A DAY    CYCLOBENZAPRINE (FLEXERIL) 10 MG TABLET    Take 1 tablet by mouth 3 times daily as needed for Muscle spasms    HYDROCODONE-ACETAMINOPHEN (NORCO) 5-325 MG PER TABLET    Take 1 tablet by mouth every 6 hours as needed for Pain for up to 5 days. Intended supply: 30 days    LIDOCAINE (LIDODERM) 5 %    Place 1 patch onto the skin daily 12 hours on, 12 hours off. NAPROXEN (NAPROSYN) 500 MG TABLET    Take 1 tablet by mouth 2 times daily    VARENICLINE (CHANTIX STARTING MONTH ) 0.5 MG X 11 & 1 MG X 42 TABLET    Take by mouth. ALLERGIES     Patient has no known allergies. FAMILY HISTORY           Problem Relation Age of Onset    Colon Cancer Mother         2005    Heart Disease Father     Cancer Maternal Uncle      Family Status   Relation Name Status    Mother      Father      MUnc  (Not Specified)      None otherwise stated in nurses notes    SOCIAL HISTORY      reports that he has been smoking cigarettes. He has a 45.00 pack-year smoking history. He has never used smokeless tobacco. He reports current alcohol use of about 1.0 standard drinks of alcohol per week. He reports that he does not use drugs.   Lives at home with others      PHYSICAL EXAM    (up to 7 for level 4, 8 or more for level 5)     ED Triage Vitals [21 1251]   BP Temp Temp Source Pulse Resp SpO2 Height Weight   (!) 150/91 97.8 °F (36.6 °C) Temporal 110 18 97 % 6' (1.829 m) 195 lb (88.5 kg)       Physical Exam  Vitals and nursing note reviewed. Constitutional:       Appearance: Normal appearance. He is normal weight. Cardiovascular:      Rate and Rhythm: Normal rate and regular rhythm. Pulses: Normal pulses. Dorsalis pedis pulses are 2+ on the right side and 2+ on the left side. Posterior tibial pulses are 2+ on the right side and 2+ on the left side. Heart sounds: Normal heart sounds. Pulmonary:      Effort: Pulmonary effort is normal. No respiratory distress. Breath sounds: Normal breath sounds. No stridor. No wheezing, rhonchi or rales. Abdominal:      General: Abdomen is flat. Tenderness: There is no abdominal tenderness. There is no rebound. Musculoskeletal:      Cervical back: Normal.      Thoracic back: Normal.      Lumbar back: Normal.      Right hip: Tenderness and bony tenderness present. No deformity, lacerations or crepitus. Normal range of motion. Normal strength. Right upper leg: Tenderness present. No swelling, edema, deformity, lacerations or bony tenderness. Right knee: Bony tenderness present. No swelling, deformity, effusion, erythema, ecchymosis, lacerations or crepitus. Normal range of motion. Tenderness present over the medial joint line and lateral joint line. Right lower leg: Normal.      Right ankle: Normal.      Right foot: Normal.      Comments: Pain in right buttock. There is no posterior leg pain. Pain over lateral and medial aspect of thigh. No swelling. FROM. 5/5 strength. Brisk cap refill. Distal sensation intact. 2/2 dp and pt pulses. Ambulatory. No erythema. No concern for septic joint. Skin:     General: Skin is warm. Capillary Refill: Capillary refill takes less than 2 seconds. Findings: No rash. Neurological:      General: No focal deficit present. Mental Status: He is alert and oriented to person, place, and time. Mental status is at baseline.       Cranial Nerves: Cranial nerves are intact. No cranial nerve deficit. Sensory: Sensation is intact. No sensory deficit. Motor: Motor function is intact. No weakness. Coordination: Coordination is intact. Coordination normal.      Gait: Gait is intact. Gait normal.                    DIAGNOSTIC RESULTS   RADIOLOGY:   All plain film, CT, MRI, and formal ultrasound images (except ED bedside ultrasound) are read by the radiologist, see reports below, unless otherwise noted in MDM or here. XR KNEE RIGHT (3 VIEWS)   Final Result   No acute fracture or malalignment. Moderate tricompartmental osteoarthritis. XR PELVIS (1-2 VIEWS)   Final Result   No acute osseous or soft tissue abnormality. Degenerative change of the lower lumbar spine. Mild degenerative change of the hip joints bilaterally. LABS:  Labs Reviewed - No data to display    All other labs were within normal range or not returned as of this dictation. EMERGENCY DEPARTMENT COURSE and DIFFERENTIAL DIAGNOSIS/MDM:   Vitals:    Vitals:    07/29/21 1251   BP: (!) 150/91   Pulse: 110   Resp: 18   Temp: 97.8 °F (36.6 °C)   TempSrc: Temporal   SpO2: 97%   Weight: 195 lb (88.5 kg)   Height: 6' (1.829 m)           ED MEDICATIONS  Orders Placed This Encounter   Medications    dexamethasone (PF) (DECADRON) injection 10 mg    ketorolac (TORADOL) injection 30 mg    predniSONE (DELTASONE) 20 MG tablet     Sig: Take 1 tablet by mouth daily for 5 days     Dispense:  5 tablet     Refill:  0    diazePAM (VALIUM) injection 5 mg    diazePAM (VALIUM) 5 MG tablet     Sig: Take 1 tablet by mouth every 6 hours as needed for Anxiety for up to 3 days. Dispense:  6 tablet     Refill:  0         CONSULTS:  None    PROCEDURES:  None      Pain in right buttock radiation down into the right thigh x 1 week. Burning/ fire pain. There Is FROM and full strength. Pulses and sensation intact. There is no posterior leg tenderness, redness.      No neurological deficits. Low concern for compression. No signs of infection or red flag symptoms. Doubt abscess. No overlying erythema or swelling. FROM of all joint. Doubt septic joint. Distal pulses intact. Pain in right buttock. Concern for sciatica. Pt given decadron, toradol in ED. He was given RX for PT by PCP yesterday. Continue heating pads, pain medication. Will dc home with prednisone and valium for spasms. Strict return instructions discussed with patient. He is agreeable with plan. Discussed results and plan with the pt. They expressed appropriate understanding. Pt given close follow up, supportive care instructions and strict return instructions at the bedside. Patient instructed to return to the emergency room if symptoms worsen, return, or any other concern right away which is agreed. Follow up with PCP in 2-3 days for re-evaluation. The patient presents with low back pain without signs of spinal cord compression, cauda equina syndrome, infection, aneurysm or other serious etiology. The patient is neurologically intact. Given the extremely low risk of these diagnoses further testing and evaluation for these possibilities does not appear to be indicated at this time. The patient has been instructed to return if the symptoms worsen or change in any way.          FINAL IMPRESSION      1. Sciatica of right side    2. Spasm of muscle          DISPOSITION/PLAN   DISPOSITION     PATIENT REFERRED TO:  Alison Carson, [address] [address] [phone]          DISCHARGE MEDICATIONS:  New Prescriptions    DIAZEPAM (VALIUM) 5 MG TABLET    Take 1 tablet by mouth every 6 hours as needed for Anxiety for up to 3 days.     PREDNISONE (DELTASONE) 20 MG TABLET    Take 1 tablet by mouth daily for 5 days       (Please note that portions of this note were completed with a voice recognition program.  Efforts were made to edit the dictations but occasionally words are mis-transcribed.)    Zach Rosales [address] Ayan Martinez PA-C  07/29/21 1818

## 2021-08-11 ENCOUNTER — HOSPITAL ENCOUNTER (OUTPATIENT)
Dept: PHYSICAL THERAPY | Age: 61
Setting detail: THERAPIES SERIES
Discharge: HOME | End: 2021-08-11
Payer: COMMERCIAL

## 2021-08-11 PROCEDURE — 97110 THERAPEUTIC EXERCISES: CPT

## 2021-08-11 PROCEDURE — 97162 PT EVAL MOD COMPLEX 30 MIN: CPT

## 2021-08-11 NOTE — CONSULTS
[address]  [address] [facility]         Phone: [phone]       Fax: [phone]    Physical Therapy Lower Extremity Evaluation    Date:  2021  Patient: Garrick Ashford  : 1960  MRN: [id number]  Physician:  Edward Ott MD    Insurance: Arline Harada: NPRe  # of visits allowed/remainin  Medical Diagnosis:  M25.551 (ICD-10-CM) - Right hip pain  Rehab Codes: M 54.41, R 26.2 NEC   Onset date:  21  Next Dr's appt.:   Visit Count:    Cancel/No Show: 0/0    Subjective:   CC: Pt is a pleasant 62 yo male reporting acute on chronic low back pain with no SOSA. Pt reports presence of low back pain present for months with severe worsening on  with no injury, leading to ED on  and . Pt notes severe low back pain that radiates down R hip to knee laterally, as well as groin pain radiating to medial R knee. Pain worsens with any standing longer than a few minutes, walking a few minutes, and unable to sit. Pt performs ADLs in very short increments with frequent supine positioning. Pain improves with supine laying and B hip flexion. Pain has slightly improved with seeing massage therapist five times over the last few weeks but muscle tightness and pain quickly returns. Pt denies N/T, hx of falls, changes in B/B, and no change in strength.      HPI: (onset date) 21 severe worsening of low back pain with R radiculopathy leading to inability to stand/walk for a few minutes at a time     PMHx: [] Unremarkable [] Diabetes [] HTN  [] Pacemaker   [] MI/Heart Problems [] Cancer [] Arthritis [] Other:              [x] Refer to full medical chart  In EPIC     Comorbidities:   [] Obesity [] Dialysis  [] Other:   [] Asthma/COPD [] Dementia [] Other:   [] Stroke [] Sleep apnea [] Other:   [] Vascular disease [] Rheumatic disease [] Other:     Tests: [x] X-Ray: [] MRI:  [] Other:      21 XR LUMBAR SPINE   FINDINGS:   5 lumbar type vertebral bodies are present.  The vertebral body heights are   maintained.  No listhesis.  Multilevel degenerative disc disease is present   in the mid and lower lumbar spine, advanced at L5-S1.  Facet arthropathy is   also notable at L5-S1.       Calcified atheromatous plaque. 7/29/21 XR KNEE RIGHT  Impression   No acute fracture or malalignment.       Moderate tricompartmental osteoarthritis.      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  Lots of driving    [] Normal Duty  [] Light Duty   [x] Off D/T Condition  [] Retired    [] Not Employed    []  Disability  Return to work:    Gait    Device: 4 ww, no AD prior to pain recently   Distance: 5 min now, unlimited prior   Assistance: indep   Impairments: slow rodger, increased trunk flexion           Pain location:  R low back down to lateral knee, groin to medial knee    Pain rating/description at rest:  2/10 in supine laying    Pain rating/description at best:   2/10  Improves with: massage therapy, supine laying    Pain rating/description at worst:   10/10  Worsens with: standing more than five minutes, no sitting, anything    Sleep quality/quanity  always had difficulty   Sleeping position: R        Objective:    ROM  ° A/P STRENGTH    Left Right Left Right   Hip Flex   4/5 3+/5*   Ext   NT NT   ER       IR       ABD   4-/5 3+/5*   ADD       Knee Flex   5/5 4-/5   Ext   5/5 4/5   Ankle DF   5/5 5/5   PF INV       EVER              * = pt reports increased pain     LUMBAR ROM/ Special Tests Left Right   Lumbar flexion     Lumbar extension      Lumbar SB     Lumbar Rotation      Standing Flexion + []         - []           NT []    + []         - []           NT []      Repeated Flexion + []         - [x]           NT []    + []         - [x]           NT []      Repeated Extension + [x]         - []           NT []   Painful/restricted + [x]         - []           NT []   Painful/restricted   SLR + []         - [x]           NT []  + []         - []           NT []    ROM unable to be tested d/t pt's inability to stand for only very short durations         OBSERVATION No Deficit Deficit Not Tested Comments   Posture       Forward Head [] [x] []    Rounded Shoulders [] [x] []    Leg Length Discrp [x] [] []    Slumped Sitting [x] [] []    Palpation [] [x] [] Severe TTP to R paraspinals and R lumbar musculature; moderate TTP to R glut med, piriformis, and ITB   Sensation [x] [] []        BALANCE Left Right   Tandem Stance (Eyes Open)     Tandem Stance (Eyes Closed)     Single Leg (Eyes Open)     Single Leg (Eyes Closed     Other:     Pt's balance unable to be tested d/t limited standing tolerance and severe pain       Assessment: [x] Patient would continue to benefit from skilled physical therapy services in order to: decrease pain, improve lumbar mobility, improve R LE strength, progress walking and standing tolerance to decrease functional impairment and return to PLOF. STG: (to be met in 6 treatments)  1. ? Pain: Pt will report decreased pain 7/10 to improve standing tolerance to 10 minutes   2. ? ROM: Pt will be able to perform lumbar ROM screening demonstrating ROM limited by only 25% or less in flex, SB, and rotation. 3. ? Strength: Pt will demonstrate improved R LE strength to 4-/5 or higher   4.  Pt will demonstrate improved posture with functional tasks   5. ? Function: Pt will report decreased difficulty performing ADLs and requiring less rest breaks   6. Independent with Home Exercise Programs    LTG: (to be met in 12 treatments)  1. Pt will demonstrate lumbar ROM WNL to normalize mobility   2. Pt will report decreased pain 5/10 or less to perform standing for 30 minutes or more  3. Pt will demonstrate improved R LE strength to 4/5 or higher to ambulate with least restrictive AD for 45 minutes or more  4. Pt's balance goal to be created at re-assessment                    Patient goals: get out of pain, go back to work, see life on a vertical not horizontal       Evaluation Complexity:  History (Personal factors, comorbidities) [] 0 [x] 1-2 [] 3+   Exam (limitations, restrictions) [] 1-2 [] 3 [x] 4+   Clinical presentation (progression) [] Stable [x] Evolving  [] Unstable   Decision Making [] Low [x] Moderate [] High    [] Low Complexity [x] Moderate Complexity [] High Complexity       Rehab Potential:  [x] Good  [] Fair  [] Poor   Suggested Professional Referral:  [x] No  [] Yes:  Barriers to Goal Achievement[de-identified]  [] No  [x] Yes: severity of functional impairment   Domestic Concerns:  [x] No  [] Yes:     Pt. Education:  [x] Plans/Goals, Risks/Benefits discussed  [x] Home exercise program    Method of Education: [x] Verbal  [x] Demo  [x] Written  Comprehension of Education:    Access Code: [email or web address]  URL: [email or web address]  Date: 08/11/2021  Prepared by: Dale Cruz    Exercises  Supine Lower Trunk Rotation - 1 x daily - 7 x weekly - 2 sets - 10 reps  Supine Figure 4 Piriformis Stretch - 1 x daily - 7 x weekly - 1 sets - 3 reps - 30 hold  Supine Piriformis Stretch with Foot on Ground - 1 x daily - 7 x weekly - 1 sets - 3 reps - 30 hold  Sidelying Thoracic Rotation with Open Book - 1 x daily - 7 x weekly - 2 sets - 10 reps  Clamshell - 1 x daily - 7 x weekly - 2 sets - 10 reps  Supine Hamstring Stretch - 1 x daily - 7 x weekly - 2 sets - 10 reps    [x] Verbalizes understanding.   [x] Demonstrates understanding. [x] Needs Review. [] Demonstrates/verbalizes understanding of HEP/Ed previously given.     Treatment Plan:  [x] Therapeutic Exercise   99500  [] Iontophoresis: 4 mg/mL Dexamethasone Sodium Phosphate  mAmin  69948   [] Therapeutic Activity  31411 [] Vasopneumatic cold with compression  51408    [] Gait Training   79795 [] Ultrasound   02573   [x] Neuromuscular Re-education  76855 [] Electrical Stimulation Unattended  70119   [x] Manual Therapy  49959 [] Electrical Stimulation Attended  20376   [x] Instruction in HEP  [] Lumbar/Cervical Traction  27628   [] Aquatic Therapy   68551 [x] Cold/hotpack    [] Massage   70531      [] Dry Needling, 1 or 2 muscles  36919   [] Biofeedback, first 15 minutes   39372  [] Biofeedback, additional 15 minutes   72961 [] Dry Needling, 3 or more muscles  26693       Frequency:   2x/week for 12 visits      Todays Treatment:  Modalities:   Precautions: flexion based program, pt cannot tolerate sitting, can only tolerate standing/walking for very minimal time span, progress as tolerate (fear avoidance)    Exercises:   Exercise Reps/ Time Weight/ Level Comments   LTR x10     Figure 4 stretch 2x30\"  R LE   Piriformis stretch 2x30\"  L LE   Active hamstretch 2x10     SL clamshells 2x10     SL open books 2x10           4ww fitting  5 min  Adjustment of height and cues for posture and sequencing    education 20 min   Importance of therapy and HEP, flexion biased program, POC and goals, etiology of symptoms, use of heat/ice to decrease pain and inflammation, progress activity and HEP within toleration, in symptoms progress then referral for MRI will be sought    Other: exercises provided for HEP     Specific Instructions for next treatment: progress SUPINE or SIDELYING exercises as tolerate, progress R hip/knee strength, HP and light manual to R low back and hip       Treatment Charges: Mins Units   [x] Evaluation       []  Low       [x]  Moderate       []  High 35 1   [] Modalities     [x]  Ther Exercise 40 3   []  Manual Therapy     []  Ther Activities     []  Aquatics     []  Neuromuscular     []  Gait Training     []  Dry Needling           1-2 muscles     []  Dry Needling           3 or more          muscles     [] Vasocompression     []  Other         TOTAL TREATMENT TIME: 75    Time in:  6:10pm    Time Out: 7:25pm    Electronically signed by: Alicja Javed PT

## 2021-08-17 ENCOUNTER — HOSPITAL ENCOUNTER (OUTPATIENT)
Dept: PHYSICAL THERAPY | Age: 61
Setting detail: THERAPIES SERIES
Discharge: HOME | End: 2021-08-17
Payer: COMMERCIAL

## 2021-08-17 PROCEDURE — 97110 THERAPEUTIC EXERCISES: CPT

## 2021-08-17 PROCEDURE — 97140 MANUAL THERAPY 1/> REGIONS: CPT

## 2021-08-17 NOTE — PROGRESS NOTES
[facility]   [address]   Phone: [phone]   Fax: [phone]    Physical Therapy Daily Treatment Note      Date:  2021  Patient Name:  Cherry Wisdom    :  1960  MRN: [id number]  Physician:      John Wellington MD                  Insurance: [facility]: NPRe  # of visits allowed/remainin  Medical Diagnosis:   M25.551 (ICD-10-CM) - Right hip pain  Rehab Codes: M 54.41, R 26.2 NEC   Onset date:    21                        Next Dr's appt.:   Visit Count:                     Cancel/No Show: 0/0    Subjective:    Patient reports he is able to get around and sleep a little bit since the last time he was here, pain has improved some but believes its because he uses a knee brace that wraps around R thigh very tightly to help with the muscle spasms.    Pain:  [x] Yes  [] No Location: R LBP radiates to R lateral hip and groin Pain Rating: (0-10 scale) 9-10/10   Pain altered Tx:  [] No  [] Yes  Action:  Comments:    Objective:  Modalities:   Precautions:  Exercises:  Exercise Reps/ Time Weight/ Level Completed  Today Comments   SKTC 10x10\"  x    LTR 10x10\"  x Inc pain, encouraged small range    Active HS stretch  10x10\"  x           Supine hooklying glute squeeze  10x5\"  x    Supine hooklying add squeeze  10x5\"  x    Supine BKFO 10x2 red x    Supine Bridge  10x  5\" hold   x    Supine Bridge with hip abd  10x  5\" hold  red x    Supine Bridge with Hip add  10x  5\" rainbow ball  x    Supine hooklying SLR  10x  x Small range    Supine hookyling march with TrA 10x2 red x                  Manual  10'  x Manual with pressure point release to R glute and hip    Other:    Specific Instructions for next treatment:      Assessment:  Patient had multiple instance of sharp pains throughout hip and glut this date, exercises in supine hooklying did not cause increased pain with brace on thigh, Patient was tender to touch in R LB and Glute manual completed prior to completion of exercises with fair results of pain relief mostly relief of \"spasm\" in glute. Patient able to complete all exercises but was completing with slow guarded movements due to fear of increased pain symptoms. [] Progressing toward goals. [] No change. [] Other:    [] Patient would continue to benefit from skilled physical therapy services in order to: decrease pain, improve lumbar mobility, improve R LE strength, progress walking and standing tolerance to decrease functional impairment and return to PLOF. STG: (to be met in 6 treatments)  1. ? Pain: Pt will report decreased pain 7/10 to improve standing tolerance to 10 minutes   2. ? ROM: Pt will be able to perform lumbar ROM screening demonstrating ROM limited by only 25% or less in flex, SB, and rotation. 3. ? Strength: Pt will demonstrate improved R LE strength to 4-/5 or higher   4. Pt will demonstrate improved posture with functional tasks   5. ? Function: Pt will report decreased difficulty performing ADLs and requiring less rest breaks   6. Independent with Home Exercise Programs     LTG: (to be met in 12 treatments)  1. Pt will demonstrate lumbar ROM WNL to normalize mobility   2. Pt will report decreased pain 5/10 or less to perform standing for 30 minutes or more  3. Pt will demonstrate improved R LE strength to 4/5 or higher to ambulate with least restrictive AD for 45 minutes or more  4. Pt's balance goal to be created at re-assessment                     Patient goals: get out of pain, go back to work, see life on a vertical not horizontal        Pt. Education:  [x] Yes  [] No  [] Reviewed Prior HEP/Ed  Method of Education: [x] Verbal  [x] Demo  [] Written  Comprehension of Education:  [x] Verbalizes understanding. [x] Demonstrates understanding. [] Needs review. [] Demonstrates/verbalizes HEP/Ed previously given. Plan: [x] Continue per plan of care.    [] Other:      Treatment Charges: Mins

## 2021-08-20 ENCOUNTER — HOSPITAL ENCOUNTER (OUTPATIENT)
Dept: PHYSICAL THERAPY | Age: 61
Setting detail: THERAPIES SERIES
Discharge: HOME | End: 2021-08-20
Payer: COMMERCIAL

## 2021-08-20 PROCEDURE — 97110 THERAPEUTIC EXERCISES: CPT

## 2021-08-20 NOTE — PROGRESS NOTES
[facility]   [facility]   Phone: [phone]   Fax: [phone]    Physical Therapy Daily Treatment Note      Date:  2021  Patient Name:  Isidoro Rees    :  1960  MRN: [id number]  Physician:      Hilary Read MD                  Insurance: Marc Worrelln: [facility]  # of visits allowed/remainin  Medical Diagnosis:   M25.551 (ICD-10-CM) - Right hip pain  Rehab Codes: M 54.41, R 26.2 NEC   Onset date:    21                        Next Dr's appt.:   Visit Count:    3/12                 Cancel/No Show: 0/0    Subjective:    Pt reported that he started using a R thigh brace on 21 (afternoon) and a R knee brace today. He feels much better when using these. Pt reports that he can stand for a little while, but the pain slowly creeps from the hip to the leg, then he has to sit. Pain:  [x] Yes  [] No Location: travels between his R lumbar, groin, knee, and hip.  Pain Rating: (0-10 scale) 2/10   Pain altered Tx:  [] No  [] Yes  Action:  Comments:    Objective:  Modalities:   Precautions:  Exercises:  Exercise Reps/ Time Weight/ Level Completed  Today Comments   SKTC 10x10\"  x    LTR 10x10\"  x Inc pain, encouraged small range    Standing HS stretch  10x10\"  x Standing with rollator for support          Supine hooklying glute squeeze  10x5\"  x    Supine hooklying add squeeze  10x5\"  x    Supine BKFO 10x2 red x    Supine Bridge  10x  5\" hold   x    Supine Bridge with hip abd  10x  5\" hold  red x    Supine Bridge with Hip add  10x  5\" rainbow ball  x    Supine hooklying SLR  10x  x Small range    Supine hookyling march with TrA 10x2  x           Figure 4 stretch 2x30\"  x R LE   Piriformis stretch 2x30\"  x L LE   SL clamshells 2x10  x    SL open books 2x10  x           Manual  10'   Manual with pressure point release to R glute and hip       Other:    Specific Instructions for next treatment:      Assessment:  Patient able to complete all exercises but was completing with slow guarded movements due to fear of increased pain symptoms. [x] Progressing toward goals. [] No change. [] Other:    [x] Patient would continue to benefit from skilled physical therapy services in order to: decrease pain, improve lumbar mobility, improve R LE strength, progress walking and standing tolerance to decrease functional impairment and return to PLOF. STG: (to be met in 6 treatments)  1. ? Pain: Pt will report decreased pain 7/10 to improve standing tolerance to 10 minutes   2. ? ROM: Pt will be able to perform lumbar ROM screening demonstrating ROM limited by only 25% or less in flex, SB, and rotation. 3. ? Strength: Pt will demonstrate improved R LE strength to 4-/5 or higher   4. Pt will demonstrate improved posture with functional tasks   5. ? Function: Pt will report decreased difficulty performing ADLs and requiring less rest breaks   6. Independent with Home Exercise Programs     LTG: (to be met in 12 treatments)  1. Pt will demonstrate lumbar ROM WNL to normalize mobility   2. Pt will report decreased pain 5/10 or less to perform standing for 30 minutes or more  3. Pt will demonstrate improved R LE strength to 4/5 or higher to ambulate with least restrictive AD for 45 minutes or more  4. Pt's balance goal to be created at re-assessment                     Patient goals: get out of pain, go back to work, see life on a vertical not horizontal        Pt. Education:  [] Yes  [] No  [x] Reviewed Prior HEP/Ed  Method of Education: [x] Verbal  [x] Demo  [] Written  Comprehension of Education:  [x] Verbalizes understanding. [x] Demonstrates understanding. [] Needs review. [] Demonstrates/verbalizes HEP/Ed previously given. Plan: [x] Continue per plan of care.    [] Other:      Treatment Charges: Mins Units   []  Modalities     [x]  Ther Exercise 50 3   []  Manual Therapy     []  Ther Activities     []  Aquatics     []  Neuromuscular [] Vasocompression     [] Gait Training     [] Dry needling        [] 1 or 2 muscles        [] 3 or more muscles     []  Other     Total Treatment time 50 3     Time In:  15:15  Time Out:  16:05    Electronically signed by:  Destin Terrazas PTA

## 2021-08-24 ENCOUNTER — HOSPITAL ENCOUNTER (OUTPATIENT)
Dept: PHYSICAL THERAPY | Age: 61
Setting detail: THERAPIES SERIES
End: 2021-08-24
Payer: COMMERCIAL

## 2021-08-24 ENCOUNTER — HOSPITAL ENCOUNTER (OUTPATIENT)
Dept: PHYSICAL THERAPY | Age: 61
Setting detail: THERAPIES SERIES
Discharge: HOME | End: 2021-08-24
Payer: COMMERCIAL

## 2021-08-24 PROCEDURE — 97140 MANUAL THERAPY 1/> REGIONS: CPT

## 2021-08-24 PROCEDURE — 97110 THERAPEUTIC EXERCISES: CPT

## 2021-08-24 NOTE — PROGRESS NOTES
[facility]   [address] [facility] [address]   Phone: [phone]   Fax: [phone]    Physical Therapy Daily Treatment Note      Date:  2021  Patient Name:  Cherry Wisdom    :  1960  MRN: [id number]  Physician:      John Wellington MD                  Insurance: [facility]: [facility]  # of visits allowed/remainin  Medical Diagnosis:   M25.551 (ICD-10-CM) - Right hip pain  Rehab Codes: M 54.41, R 26.2 NEC   Onset date:    21                        Next Dr's appt.:   Visit Count:                     Cancel/No Show: 0/0    Subjective:    Pt states his pain is gradually getting better, able to stand for about 10 minutes now and walk longer distance. Pain:  [x] Yes  [] No Location: travels between his R lumbar, groin, knee, and hip.  Pain Rating: (0-10 scale) 2/10   Pain altered Tx:  [] No  [] Yes  Action:  Comments:    Objective:  Modalities:   Precautions:  Exercises:  Exercise Reps/ Time Weight/ Level Completed  Today Comments   SKTC 10x10\"      LTR 10x10\"   Inc pain, encouraged small range    HS pumps  10x10\"  x Standing with rollator for support          Supine hooklying glute squeeze  10x5\"      Supine hooklying add squeeze  10x5\"      Supine BKFO 10x2 red x    Supine Bridge  10x  5\" hold   x    Supine Bridge with hip abd  10x  5\" hold  red x    Supine Bridge with Hip add  10x  5\" volleyball  x    Supine hooklying SLR  10x   Small range    Supine hookyling march with TrA 10x2  x           Figure 4 stretch 2x30\"  x R LE   Piriformis stretch 2x30\"   L LE   SL clamshells 2x10  x    SL open books 2x10             Manual  10'   Manual with pressure point release to R glute and hip and manual (B) hip distraction 3x30\" ea LE       Other:    Specific Instructions for next treatment:      Assessment:  Completed all exercise as time allowed, patient was able to better tolerate exercises, less pain and demonstrated less fatigue throughout, observed better bed mobility this date with less guarded movements. [x] Progressing toward goals. [] No change. [] Other:    [x] Patient would continue to benefit from skilled physical therapy services in order to: decrease pain, improve lumbar mobility, improve R LE strength, progress walking and standing tolerance to decrease functional impairment and return to PLOF. STG: (to be met in 6 treatments)  1. ? Pain: Pt will report decreased pain 7/10 to improve standing tolerance to 10 minutes   2. ? ROM: Pt will be able to perform lumbar ROM screening demonstrating ROM limited by only 25% or less in flex, SB, and rotation. 3. ? Strength: Pt will demonstrate improved R LE strength to 4-/5 or higher   4. Pt will demonstrate improved posture with functional tasks   5. ? Function: Pt will report decreased difficulty performing ADLs and requiring less rest breaks   6. Independent with Home Exercise Programs     LTG: (to be met in 12 treatments)  1. Pt will demonstrate lumbar ROM WNL to normalize mobility   2. Pt will report decreased pain 5/10 or less to perform standing for 30 minutes or more  3. Pt will demonstrate improved R LE strength to 4/5 or higher to ambulate with least restrictive AD for 45 minutes or more  4. Pt's balance goal to be created at re-assessment                     Patient goals: get out of pain, go back to work, see life on a vertical not horizontal        Pt. Education:  [] Yes  [] No  [x] Reviewed Prior HEP/Ed  Method of Education: [x] Verbal  [x] Demo  [] Written  Comprehension of Education:  [x] Verbalizes understanding. [x] Demonstrates understanding. [] Needs review. [] Demonstrates/verbalizes HEP/Ed previously given. Plan: [x] Continue per plan of care.    [] Other:      Treatment Charges: Mins Units   []  Modalities     [x]  Ther Exercise 20 1   []  Manual Therapy 10 1   []  Ther Activities     []  Aquatics     []  Neuromuscular     [] Vasocompression     [] Gait Training     [] Dry needling        [] 1 or 2 muscles        [] 3 or more muscles     []  Other     Total Treatment time 30 2     Time In:  335pm  Time Out:  405pm    Electronically signed by:  Augusta Stephen PTA

## 2021-08-26 ENCOUNTER — HOSPITAL ENCOUNTER (OUTPATIENT)
Dept: PHYSICAL THERAPY | Age: 61
Setting detail: THERAPIES SERIES
Discharge: HOME | End: 2021-08-26
Payer: COMMERCIAL

## 2021-08-26 PROCEDURE — 97140 MANUAL THERAPY 1/> REGIONS: CPT

## 2021-08-26 PROCEDURE — 97110 THERAPEUTIC EXERCISES: CPT

## 2021-08-26 NOTE — PROGRESS NOTES
[facility]   [address]   Phone: [phone]   Fax: [phone]    Physical Therapy Daily Treatment Note      Date:  2021  Patient Name:  Marysol Townsend    :  1960  MRN: [id number]  Physician:      Royal Fletcher MD                  Insurance: [employer]ger: NPRe  # of visits allowed/remainin  Medical Diagnosis:   M25.551 (ICD-10-CM) - Right hip pain  Rehab Codes: M 54.41, R 26.2 NEC   Onset date:    21                        Next Dr's appt.:   Visit Count:                     Cancel/No Show: 0/0    Subjective:    Pt comes to clinic reporting increased pain this date. Pt had massage on Monday that decreased pain but felt increased pain post session yesterday. Pt notes standing tolerance down to 3 minutes. Pain:  [x] Yes  [] No Location: travels between his R lumbar, groin, knee, and hip.  Pain Rating: (0-10 scale) 4/10   Pain altered Tx:  [] No  [] Yes  Action:  Comments:    Objective:  Modalities:   Precautions: significant standing/sitting tolerance deficit   Exercises: flexion biased   Exercise Reps/ Time Weight/ Level Completed  Today Comments   SKTC 5x10\"  X    LTR 10x10\"  X Inc pain, encouraged small range    Active hamstring stretch 10x10\"  X           Supine hooklying glute squeeze  10x5\"      Supine hooklying add squeeze  10x5\"      Supine BKFO 10x2 red X    Supine Bridge  20x  5\" hold   X    Supine Bridge with hip abd  10x  5\" hold  red X    Supine Bridge with Hip add  10x  5\" volleyball      Supine hooklying SLR  10x  X Small range    Supine hookyling march with TrA 10x2 red X           Figure 4 stretch 2x30\"  X R LE   Piriformis stretch 2x30\"  X L LE   SL clamshells 2x10      SL open books 2x10             Sitting lumbar flex/ext x10  X Blue swiss ball    Hand on thigh push ups x10  X Flex to neutral    Standing flex/ext x10  X Hands over WOW performing flex to neutral           Manual  10'  X Manual with pressure point release to R paraspinals, R glute and hip and manual (B) hip distraction 3x30\" ea LE       Other:    Specific Instructions for next treatment: progress lumbar ROM as tolerated, progress sitting/standing tolerance as tolerated       Assessment:      [x] Progressing toward goals. Pt able to perform charted exercises with improved tolerance but continues to require increased time to perform. Pt able to progress this date with addition of increased mat exercises reps/sets, addition of red theraband to marches, and addition of sitting/standing flex to neutral to improve posture and function. Pt advised to slowly decrease hip flexion at home and slowly straighten legs to progress straightening of spine. Manual provided at end of session with pt in L SL with trigger point massage provided to R paraspinals and R glut med. [] No change. [] Other:    [x] Patient would continue to benefit from skilled physical therapy services in order to: decrease pain, improve lumbar mobility, improve R LE strength, progress walking and standing tolerance to decrease functional impairment and return to PLOF. STG: (to be met in 6 treatments)  1. ? Pain: Pt will report decreased pain 7/10 to improve standing tolerance to 10 minutes   2. ? ROM: Pt will be able to perform lumbar ROM screening demonstrating ROM limited by only 25% or less in flex, SB, and rotation. 3. ? Strength: Pt will demonstrate improved R LE strength to 4-/5 or higher   4. Pt will demonstrate improved posture with functional tasks   5. ? Function: Pt will report decreased difficulty performing ADLs and requiring less rest breaks   6. Independent with Home Exercise Programs     LTG: (to be met in 12 treatments)  1. Pt will demonstrate lumbar ROM WNL to normalize mobility   2. Pt will report decreased pain 5/10 or less to perform standing for 30 minutes or more  3.  Pt will demonstrate improved R LE strength to 4/5 or higher to ambulate with

## 2021-09-02 ENCOUNTER — HOSPITAL ENCOUNTER (OUTPATIENT)
Dept: PHYSICAL THERAPY | Age: 61
Setting detail: THERAPIES SERIES
Discharge: HOME | End: 2021-09-02
Payer: COMMERCIAL

## 2021-09-02 PROCEDURE — 97110 THERAPEUTIC EXERCISES: CPT

## 2021-09-02 NOTE — PROGRESS NOTES
[address] [facility]   [address]   Phone: [phone]   Fax: [phone]    Physical Therapy Daily Treatment Note      Date:  2021  Patient Name:  Rohini Henderson    :  1960  MRN: [id number]  Physician:      Chantel Perez MD                  Insurance: Cira Harry: NPRe  # of visits allowed/remainin  Medical Diagnosis:   M25.551 (ICD-10-CM) - Right hip pain  Rehab Codes: M 54.41, R 26.2 NEC   Onset date:    21                        Next Dr's appt.:   Visit Count:                     Cancel/No Show: 0/0    Subjective:    Pt comes to therapy with 4ww noting decreased pain /10 this date. Pt notes good improvement of function with sitting tolerance to 15 minutes, standing tolerance 5 minutes (compared to 0), and improvement of walking to 5-8 minutes (versus 2 min). Pt continues to receive frequent professional body massages which improve pain. Pt performs HEP twice a day and continues to move around at least once an hour at home. Pain has improved in knee and is mainly located in R groin. Max pain intensity has greatly improved from 10/10 to 5/10. Pain location:  R low back down to lateral knee, groin to medial knee    Pain rating/description at rest:  2-3/10 in supine laying    Pain rating/description at best: 2/10  Improves with: massage therapy, supine laying, NSAIDs/naproxen     Pain rating/description at worst:  4-5/10 when standing/sitting  Worsens with: standing more than five minutes, sitting 15 min    Sleep quality/quanity  always had difficulty - 3 hours continuously at a time    Sleeping position: B sidelying a little, mainly supine with pillow under the knees      Pain:  [x] Yes  [] No Location: travels between his R lumbar, groin, knee, and hip.  Pain Rating: (0-10 scale) 2/10   Pain altered Tx:  [] No  [] Yes  Action:  Comments:    Objective:  Modalities:   Precautions: significant standing/sitting tolerance SB  lateral aspect of knee Lateral aspect of knee    Lumbar Rotation   wnl - stiffness wnl - stiffness   Standing Flexion + []? ?         - []? ?           GF []??     + []? ?         - []? ?           NY []??       Repeated Flexion + []? ?         - [x]? ?           GQ []??     + []? ?         - [x]? ?           VT []??       Repeated Extension + [x]? ?         - []? ?           UF []??   Painful/restricted + [x]? ?         - []? ?           GN []??   Painful/restricted   SLR + []? ?         - [x]? ?           UC []??  + []? ?         - []? ?           PD []??    ROM unable to be tested d/t pt's inability to stand for only very short durations      Assessment:      [x] Progressing toward goals. Pt has completed 6/12 therapy sessions so re-assessment warranted this date. Pt demonstrates great improvement of sitting, standing, and walking tolerance. Pt's lumbar ROM was able to be measured this date demonstrating only 25% limited range in all planes with only reports of general stiffness. Pt able to progress this date with additional sitting exercises and able to tolerate a few standing activities but reported increased pain 4-5/10 which improved to 2/10 after 2 min of sitting in lumbar flexion. New HEP provided this date for pt to progress at home and educated on importance of progression sitting/standing/walking tolerance every day as much as tolerated to return to PLOF. [] No change. [] Other:    [x] Patient would continue to benefit from skilled physical therapy services in order to: decrease pain, improve lumbar mobility, improve R LE strength, progress walking and standing tolerance to decrease functional impairment and return to PLOF.       STG: (to be met in 6 treatments) - assessed 9/2 by Lela Luz, PT  1. ? Pain: Pt will report decreased pain 7/10 to improve standing tolerance to 10 minutes   - MET 5/10 pain   2. ? ROM: Pt will be able to perform lumbar ROM screening demonstrating ROM limited by only 25% or less in Exercise 55 4   []  Manual Therapy     []  Ther Activities     []  Aquatics     []  Neuromuscular     [] Vasocompression     [] Gait Training     [] Dry needling        [] 1 or 2 muscles        [] 3 or more muscles     []  Other     Total Treatment time 55 4     Time In:  5:05pm  Time Out:  6:00pm    Electronically signed by:  Leatha Franklin PT

## 2021-09-08 ENCOUNTER — HOSPITAL ENCOUNTER (OUTPATIENT)
Dept: PHYSICAL THERAPY | Age: 61
Setting detail: THERAPIES SERIES
Discharge: HOME | End: 2021-09-08
Payer: COMMERCIAL

## 2021-09-08 PROCEDURE — 97110 THERAPEUTIC EXERCISES: CPT

## 2021-09-08 NOTE — FLOWSHEET NOTE
Thoracic/lumbar ext x10  X Neutral to ext    Sitting SB x10  X Blue swiss ball    Sitting lumbar flex/ext x10  X Blue swiss ball           Standing       Mini squats 2x10  X    Marches  x20  X No UE support   3 way hip x10  X                  Manual  10'  - Pt wishes to only receive manual through professional massage therapist - 9/2      Other:    Specific Instructions for next treatment: progress lumbar ROM as tolerated, progress sitting/standing tolerance as tolerated   9/2:   Walking tolerance: 5 min   Sitting tolerance: 15 min   Standing Tolerance:  5 min      Assessment:    [x] Progressing toward goals. Pt able to progress this date with increased ROM when performing exercises and requiring decreased time to perform. Pt notes no increased pain when sitting and able to stand with only minimal increase of pain. Pt notes pain only travels down to mid thigh rather to knee. Pt advised to continue daily progression of walking/standing tolerance. [] No change. [] Other:    [x] Patient would continue to benefit from skilled physical therapy services in order to: decrease pain, improve lumbar mobility, improve R LE strength, progress walking and standing tolerance to decrease functional impairment and return to PLOF. STG: (to be met in 6 treatments) - assessed 9/2 by Godwin Barrientos PT  1. ? Pain: Pt will report decreased pain 7/10 to improve standing tolerance to 10 minutes   - MET 5/10 pain   2. ? ROM: Pt will be able to perform lumbar ROM screening demonstrating ROM limited by only 25% or less in flex, SB, and rotation.  - MET  3. ? Strength: Pt will demonstrate improved R LE strength to 4-/5 or higher   - MET   4. Pt will demonstrate improved posture with functional tasks   - MET   5. ? Function: Pt will report decreased difficulty performing ADLs and requiring less rest breaks   - MET  6. Independent with Home Exercise Programs  - MET     LTG: (to be met in 12 treatments)  1.  Pt will demonstrate lumbar

## 2021-09-10 ENCOUNTER — HOSPITAL ENCOUNTER (OUTPATIENT)
Dept: PHYSICAL THERAPY | Age: 61
Setting detail: THERAPIES SERIES
Discharge: HOME | End: 2021-09-10
Payer: COMMERCIAL

## 2021-09-10 PROCEDURE — 97110 THERAPEUTIC EXERCISES: CPT

## 2021-09-10 NOTE — FLOWSHEET NOTE
509 [facility] Outpatient Physical Therapy   [address] [facility] [address]   Phone: [phone]   Fax: [phone]    Physical Therapy Daily Treatment Note      Date:  9/10/2021  Patient Name:  Ashley Walton    :  1960  MRN: [id number]  Physician:      Rachid Green MD                  Insurance: [employer] [facility]  # of visits allowed/remainin  Medical Diagnosis:   M25.551 (ICD-10-CM) - Right hip pain  Rehab Codes: M 54.41, R 26.2 NEC   Onset date:    21                        Next Dr's appt.:   Visit Count:                    Cancel/No Show: 0/0    Subjective:    Pt states he has noticed an inc in activity tolerance but still cannot get comfortable with sitting for long periods of time. Pt returns to work  but still required tylenol 3x/day and naproxen 2x/day for pain management. Pt states he took tylenol before coming to therapy. Pain:  [x] Yes  [] No Location: travels between his R lumbar, groin, knee, and hip.  Pain Rating: (0-10 scale) 2/10   Pain altered Tx:  [] No  [] Yes  Action:  Comments:    Objective:  Modalities:   Precautions: significant standing/sitting tolerance deficit   Exercises: flexion biased   Exercise Reps/ Time Weight/ Level Completed  Today Comments   SKTC 5x10\"  X    LTR 10x10\"  X Inc pain, encouraged small range    Active hamstring stretch 10x10\"  X           Supine BKFO 10x2 red X    Supine Bridge  20x  3\" hold       Supine Bridge with hip abd  20x 3\" hold Red X    Supine Bridge with Hip add  10x  5\" volleyball  X    Supine hooklying SLR  10x  X Small range    Supine hookyling march with TrA 10x2 red     SKFO x20      Adductor stretch 10x3\"   Supine w/ belt           Figure 4 stretch 2x30\"   R LE, HEP   Piriformis stretch 2x30\"   L LE, HEP    SL clamshells 2x10   HEP    SL open books 2x10   HEP           Seated       OH side stretch x10  X QL stretch   Thoracic/lumbar ext x10  X Neutral to ext    Sitting SB x10  X Blue swiss ball Sitting lumbar flex/ext x10  X Blue swiss ball           Standing       Mini squats 2x10  X    Marches  x20  X No UE support   3 way hip x15  X 9/10 inc reps, No UE support   Lunges fwd x10  X Added 9/10 pain noted in R knee   Step ups fwd/lat x10 6\" X Added 9/10                        Manual  10'  - Pt wishes to only receive manual through professional massage therapist - 9/2      Other:    Specific Instructions for next treatment: progress lumbar ROM as tolerated, progress sitting/standing tolerance as tolerated   9/2:   Walking tolerance: 5 min   Sitting tolerance: 15 min   Standing Tolerance:  5 min      Assessment:    [x] Progressing toward goals. Continued with strengthening and activity tolerance this session with the addition of lunges and step ups. No inc in pain or difficulty noted with added exercises. Cues as needed to correct pt's posture and technique throughout exercises to maintain proper upright posture and to ensure TrA Activation throughout exercises. Mild R knee pain felt during mini squats this session. Educated pt on lumbar towel roll for support and comfort but resulted with inc in pain and towel was removed. Tried towel roll underneath RLE in seated resulting in centralization of pain in quad/groin area. [] No change. [] Other:    [x] Patient would continue to benefit from skilled physical therapy services in order to: decrease pain, improve lumbar mobility, improve R LE strength, progress walking and standing tolerance to decrease functional impairment and return to PLOF. STG: (to be met in 6 treatments) - assessed 9/2 by Alo Pastor, PT  1. ? Pain: Pt will report decreased pain 7/10 to improve standing tolerance to 10 minutes   - MET 5/10 pain   2. ? ROM: Pt will be able to perform lumbar ROM screening demonstrating ROM limited by only 25% or less in flex, SB, and rotation.  - MET  3. ? Strength: Pt will demonstrate improved R LE strength to 4-/5 or higher   - MET   4.  Pt will demonstrate improved posture with functional tasks   - MET   5. ? Function: Pt will report decreased difficulty performing ADLs and requiring less rest breaks   - MET  6. Independent with Home Exercise Programs  - MET     LTG: (to be met in 12 treatments)  1. Pt will demonstrate lumbar ROM WNL to normalize mobility   2. Pt will report decreased pain 5/10 or less to perform standing for 30 minutes or more  3. Pt will demonstrate improved R LE strength to 4/5 or higher to ambulate with least restrictive AD for 45 minutes or more  4. Pt's balance goal to be created at re-assessment         Patient goals: get out of pain, go back to work, see life on a vertical not horizontal     Pt. Education:  [] Yes  [] No  [x] Reviewed Prior HEP/Ed  Method of Education: [x] Verbal  [x] Demo  [] Written  Comprehension of Education:  [x] Verbalizes understanding. [x] Demonstrates understanding. [] Needs review. [] Demonstrates/verbalizes HEP/Ed previously given. Access Code: [email or web address]  URL: [email or web address]  Date: 09/02/2021  Prepared by: Brent Saldivar    Exercises  Hooklying Single Knee to Chest Stretch - 1 x daily - 7 x weekly - 1 sets - 10 reps - 10 sec hold  Supine Bridge - 1 x daily - 7 x weekly - 2 sets - 10 reps  Marching Bridge - 1 x daily - 7 x weekly - 2 sets - 10 reps  Supine Active Straight Leg Raise - 1 x daily - 7 x weekly - 2 sets - 10 reps  Supine Gluteal Sets - 1 x daily - 7 x weekly - 1 sets - 1 reps - 5 min hold  Bent Knee Fallouts - 1 x daily - 7 x weekly - 2 sets - 10 reps - 5 sec hold  Seated Alternating Side Stretch with Arm Overhead - 1 x daily - 7 x weekly - 3 sets - 10 reps  Seated Thoracic Lumbar Extension - 1 x daily - 7 x weekly - 3 sets - 10 reps    Plan: [x] Continue per plan of care.    [] Other:      Treatment Charges: Mins Units   []  Modalities     [x]  Ther Exercise 40 3   []  Manual Therapy     [x]  Ther Activities 5 -   []  Aquatics     []  Neuromuscular     [] Vasocompression [] Gait Training     [] Dry needling        [] 1 or 2 muscles        [] 3 or more muscles     []  Other     Total Treatment time 45 3     Time In: 1801 Time Out:  1900    Electronically signed by:  Neda Ahumada, PTA

## 2021-09-13 ENCOUNTER — HOSPITAL ENCOUNTER (OUTPATIENT)
Dept: PHYSICAL THERAPY | Age: 61
Setting detail: THERAPIES SERIES
Discharge: HOME | End: 2021-09-13
Payer: COMMERCIAL

## 2021-09-13 PROCEDURE — 97110 THERAPEUTIC EXERCISES: CPT

## 2021-09-13 NOTE — FLOWSHEET NOTE
[facility]   [address]   Phone: [phone]   Fax: [phone]    Physical Therapy Daily Treatment Note      Date:  2021  Patient Name:  Power Emery    :  1960  MRN: [id number]  Physician:      Melvina Carnes MD                  Insurance: [employer] Sit: [facility]  # of visits allowed/remainin  Medical Diagnosis:   M25.551 (ICD-10-CM) - Right hip pain  Rehab Codes: M 54.41, R 26.2 NEC   Onset date:    21                        Next Dr's appt.:   Visit Count:                    Cancel/No Show: 0/0    Subjective  Patient states his pain continues to get better, 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 States he knows he isn't doing \"that well\" but needs to return to work. Pain:  [x] Yes  [] No Location: travels between his R lumbar, groin, knee, and hip.  Pain Rating: (0-10 scale) 2/10   Pain altered Tx:  [] No  [] Yes  Action:  Comments:    Objective:  Modalities:   Precautions: significant standing/sitting tolerance deficit   Exercises: flexion biased   Exercise Reps/ Time Weight/ Level Completed  Today Comments   SKTC 5x10\"  X    LTR 10x10\"  X Inc pain, encouraged small range    Active hamstring stretch 10x10\"  X           Supine BKFO 10x2 red X    Supine Bridge  20x  3\" hold       Supine Bridge with hip abd  20x 3\" hold Red X    Supine Bridge with Hip add  10x2  5\" volleyball  X    Supine hooklying SLR  10x2  X Small range    Supine hookyling march with TrA 10x2 red     SKFO x20      Adductor stretch 10x3\"   Supine w/ belt           Figure 4 stretch 2x30\"   R LE, HEP   Piriformis stretch 2x30\"   L LE, HEP    SL clamshells 2x10   HEP    SL open books 2x10   HEP           Seated       OH side stretch 2x10  X QL stretch   Thoracic/lumbar ext 2x10  X Neutral to ext    Sitting SB 2x10  X Blue swiss ball    Sitting lumbar flex/ext 2x10  X Blue swiss ball           Standing Mini squats 2x10  X    Marches  x20  X No UE support   3 way hip x15  X 9/10 inc reps, No UE support   Lunges fwd x10  X Added 9/10 pain noted in R knee   Step ups fwd/lat x10 6\" X Added 9/10   Standing tandem stance  30\"x3  X                   Manual  10'  - Pt wishes to only receive manual through professional massage therapist - 9/2      Other:    Specific Instructions for next treatment: progress lumbar ROM as tolerated, progress sitting/standing tolerance as tolerated   9/2:   Walking tolerance: 5 min   Sitting tolerance: 15 min   Standing Tolerance:  5 min      Assessment:    [x] Progressing toward goals. Patient was able to complete all exercises with increased sets this date, no increased pain symptoms notes, R knee discomfort with standing exercises but tolerable per patient. [] No change. [] Other:    [x] Patient would continue to benefit from skilled physical therapy services in order to: decrease pain, improve lumbar mobility, improve R LE strength, progress walking and standing tolerance to decrease functional impairment and return to PLOF. STG: (to be met in 6 treatments) - assessed 9/2 by Aisha Burton, PT  1. ? Pain: Pt will report decreased pain 7/10 to improve standing tolerance to 10 minutes   - MET 5/10 pain   2. ? ROM: Pt will be able to perform lumbar ROM screening demonstrating ROM limited by only 25% or less in flex, SB, and rotation.  - MET  3. ? Strength: Pt will demonstrate improved R LE strength to 4-/5 or higher   - MET   4. Pt will demonstrate improved posture with functional tasks   - MET   5. ? Function: Pt will report decreased difficulty performing ADLs and requiring less rest breaks   - MET  6. Independent with Home Exercise Programs  - MET     LTG: (to be met in 12 treatments)  1. Pt will demonstrate lumbar ROM WNL to normalize mobility   2. Pt will report decreased pain 5/10 or less to perform standing for 30 minutes or more  3.  Pt will demonstrate improved R LE strength to 4/5 or higher to ambulate with least restrictive AD for 45 minutes or more  4. Pt's balance goal to be created at re-assessment         Patient goals: get out of pain, go back to work, see life on a vertical not horizontal     Pt. Education:  [] Yes  [] No  [x] Reviewed Prior HEP/Ed  Method of Education: [x] Verbal  [x] Demo  [] Written  Comprehension of Education:  [x] Verbalizes understanding. [x] Demonstrates understanding. [] Needs review. [] Demonstrates/verbalizes HEP/Ed previously given. Access Code: [email or web address]  URL: [email or web address]  Date: 09/02/2021  Prepared by: Efrain Diaz    Exercises  Hooklying Single Knee to Chest Stretch - 1 x daily - 7 x weekly - 1 sets - 10 reps - 10 sec hold  Supine Bridge - 1 x daily - 7 x weekly - 2 sets - 10 reps  Marching Bridge - 1 x daily - 7 x weekly - 2 sets - 10 reps  Supine Active Straight Leg Raise - 1 x daily - 7 x weekly - 2 sets - 10 reps  Supine Gluteal Sets - 1 x daily - 7 x weekly - 1 sets - 1 reps - 5 min hold  Bent Knee Fallouts - 1 x daily - 7 x weekly - 2 sets - 10 reps - 5 sec hold  Seated Alternating Side Stretch with Arm Overhead - 1 x daily - 7 x weekly - 3 sets - 10 reps  Seated Thoracic Lumbar Extension - 1 x daily - 7 x weekly - 3 sets - 10 reps    Plan: [x] Continue per plan of care.    [] Other:      Treatment Charges: Mins Units   []  Modalities     [x]  Ther Exercise 40 3   []  Manual Therapy     []  Ther Activities     []  Aquatics     []  Neuromuscular     [] Vasocompression     [] Gait Training     [] Dry needling        [] 1 or 2 muscles        [] 3 or more muscles     []  Other     Total Treatment time 40 3     Time In: 0355pm  Time Out:  435pm    Electronically signed by:  Eduar Cornejo PTA

## 2021-09-15 ENCOUNTER — HOSPITAL ENCOUNTER (OUTPATIENT)
Dept: PHYSICAL THERAPY | Age: 61
Setting detail: THERAPIES SERIES
Discharge: HOME | End: 2021-09-15
Payer: COMMERCIAL

## 2021-09-15 PROCEDURE — 97110 THERAPEUTIC EXERCISES: CPT

## 2021-09-15 NOTE — FLOWSHEET NOTE
509 [facility] Outpatient Physical Therapy   [address]   Phone: [phone]   Fax: [phone]    Physical Therapy Daily Treatment Note      Date:  9/15/2021  Patient Name:  Idalia Moreno    :  1960  MRN: [id number]  Physician:      Eugenia Rosa MD                  Insurance: Barbara Danielle: NPRe  # of visits allowed/remainin  Medical Diagnosis:   M25.551 (ICD-10-CM) - Right hip pain  Rehab Codes: M 54.41, R 26.2 NEC   Onset date:    21                        Next Dr's appt.:   Visit Count:   10/12                 Cancel/No Show: 0/0    Subjective  Pt comes to therapy with no AD. Pt notes continued improvement of pain and function. Pt is ambulating for 10-15 min every hour. Pt is now able to do small chores around the house. Pt goes back to work on . Pt states pain ranges from 2-5/10 throughout the day but no sharp pain anymore. Pt see chiropractor for evaluation on Monday. TENs help pain throughout the day. Pain:  [x] Yes  [] No Location: travels between his R lumbar, groin, knee, and hip.  Pain Rating: (0-10 scale) 2/10   Pain altered Tx:  [] No  [] Yes  Action:  Comments:    Objective:  Modalities:   Precautions: significant standing/sitting tolerance deficit   Exercises: flexion biased   Exercise Reps/ Time Weight/ Level Completed  Today Comments   SKTC 5x10\"      LTR 10x10\"   Inc pain, encouraged small range    Active hamstring stretch 10x10\"  X           Supine BKFO 10x2 red     Supine Bridge  20x  3\" hold       Supine Bridge with hip abd  20x 3\" hold Red     Supine Bridge with Hip add  10x2  5\" volleyball      Supine hooklying SLR  10x2   Small range    Supine hookyling march with TrA 10x2 red     SKFO x20  X    Adductor stretch 10x3\"   Supine w/ belt           Figure 4 stretch 2x30\"  X R LE, HEP   Piriformis stretch 2x30\"  X L LE, HEP    SL clamshells 2x10   HEP    SL open books 2x10   HEP           Seated       OH side stretch 2x10  X QL posture with functional tasks   - MET   5. ? Function: Pt will report decreased difficulty performing ADLs and requiring less rest breaks   - MET  6. Independent with Home Exercise Programs  - MET     LTG: (to be met in 12 treatments)  1. Pt will demonstrate lumbar ROM WNL to normalize mobility   2. Pt will report decreased pain 5/10 or less to perform standing for 30 minutes or more  3. Pt will demonstrate improved R LE strength to 4/5 or higher to ambulate with least restrictive AD for 45 minutes or more  4. Pt's balance goal to be created at re-assessment         Patient goals: get out of pain, go back to work, see life on a vertical not horizontal     Pt. Education:  [] Yes  [] No  [x] Reviewed Prior HEP/Ed  Method of Education: [x] Verbal  [x] Demo  [] Written  Comprehension of Education:  [x] Verbalizes understanding. [x] Demonstrates understanding. [] Needs review. [] Demonstrates/verbalizes HEP/Ed previously given. Access Code: [email or web address]  URL: [email or web address]  Date: 09/02/2021  Prepared by: Carlton Carter    Exercises  Hooklying Single Knee to Chest Stretch - 1 x daily - 7 x weekly - 1 sets - 10 reps - 10 sec hold  Supine Bridge - 1 x daily - 7 x weekly - 2 sets - 10 reps  Marching Bridge - 1 x daily - 7 x weekly - 2 sets - 10 reps  Supine Active Straight Leg Raise - 1 x daily - 7 x weekly - 2 sets - 10 reps  Supine Gluteal Sets - 1 x daily - 7 x weekly - 1 sets - 1 reps - 5 min hold  Bent Knee Fallouts - 1 x daily - 7 x weekly - 2 sets - 10 reps - 5 sec hold  Seated Alternating Side Stretch with Arm Overhead - 1 x daily - 7 x weekly - 3 sets - 10 reps  Seated Thoracic Lumbar Extension - 1 x daily - 7 x weekly - 3 sets - 10 reps    Plan: [x] Continue per plan of care.    [] Other:      Treatment Charges: Mins Units   []  Modalities     [x]  Ther Exercise 40 3   []  Manual Therapy     []  Ther Activities     []  Aquatics     []  Neuromuscular     [] Vasocompression     [] Gait Training     [] Dry needling        [] 1 or 2 muscles        [] 3 or more muscles     []  Other     Total Treatment time 40 3     Time In: 0515pm  Time Out:  555pm    Electronically signed by:  Jeanmarie Tineo PT

## 2021-09-23 ENCOUNTER — APPOINTMENT (OUTPATIENT)
Dept: PHYSICAL THERAPY | Age: 61
End: 2021-09-23
Payer: COMMERCIAL

## 2021-09-28 ENCOUNTER — HOSPITAL ENCOUNTER (OUTPATIENT)
Age: 61
Setting detail: SPECIMEN
Discharge: HOME | End: 2021-09-28
Payer: COMMERCIAL

## 2021-09-28 ENCOUNTER — OFFICE VISIT (OUTPATIENT)
Dept: INTERNAL MEDICINE CLINIC | Age: 61
End: 2021-09-28
Payer: COMMERCIAL

## 2021-09-28 VITALS
BODY MASS INDEX: 25.09 KG/M2 | OXYGEN SATURATION: 96 % | DIASTOLIC BLOOD PRESSURE: 70 MMHG | SYSTOLIC BLOOD PRESSURE: 122 MMHG | HEART RATE: 85 BPM | WEIGHT: 185 LBS

## 2021-09-28 DIAGNOSIS — Z13.29 SCREENING FOR THYROID DISORDER: ICD-10-CM

## 2021-09-28 DIAGNOSIS — M25.551 RIGHT HIP PAIN: ICD-10-CM

## 2021-09-28 DIAGNOSIS — M79.604 RIGHT LEG PAIN: Primary | ICD-10-CM

## 2021-09-28 DIAGNOSIS — Z13.1 SCREENING FOR DIABETES MELLITUS: ICD-10-CM

## 2021-09-28 LAB
ABSOLUTE EOS #: 0.21 K/UL (ref 0–0.44)
ABSOLUTE IMMATURE GRANULOCYTE: 0.04 K/UL (ref 0–0.3)
ABSOLUTE LYMPH #: 3.39 K/UL (ref 1.1–3.7)
ABSOLUTE MONO #: 0.91 K/UL (ref 0.1–1.2)
ALBUMIN SERPL-MCNC: 4.3 G/DL (ref 3.5–5.2)
ALBUMIN/GLOBULIN RATIO: 1.5 (ref 1–2.5)
ALP BLD-CCNC: 84 U/L (ref 40–129)
ALT SERPL-CCNC: 17 U/L (ref 5–41)
ANION GAP SERPL CALCULATED.3IONS-SCNC: 14 MMOL/L (ref 9–17)
AST SERPL-CCNC: 18 U/L
BASOPHILS # BLD: 0 % (ref 0–2)
BASOPHILS ABSOLUTE: 0.04 K/UL (ref 0–0.2)
BILIRUB SERPL-MCNC: 0.19 MG/DL (ref 0.3–1.2)
BUN BLDV-MCNC: 19 MG/DL (ref 8–23)
BUN/CREAT BLD: ABNORMAL (ref 9–20)
CALCIUM SERPL-MCNC: 9.2 MG/DL (ref 8.6–10.4)
CHLORIDE BLD-SCNC: 102 MMOL/L (ref 98–107)
CO2: 21 MMOL/L (ref 20–31)
CREAT SERPL-MCNC: 0.85 MG/DL (ref 0.7–1.2)
DIFFERENTIAL TYPE: NORMAL
EOSINOPHILS RELATIVE PERCENT: 2 % (ref 1–4)
GFR AFRICAN AMERICAN: >60 ML/MIN
GFR NON-AFRICAN AMERICAN: >60 ML/MIN
GFR SERPL CREATININE-BSD FRML MDRD: ABNORMAL ML/MIN/{1.73_M2}
GFR SERPL CREATININE-BSD FRML MDRD: ABNORMAL ML/MIN/{1.73_M2}
GLUCOSE BLD-MCNC: 93 MG/DL (ref 70–99)
HCT VFR BLD CALC: 43.2 % (ref 40.7–50.3)
HEMOGLOBIN: 14.4 G/DL (ref 13–17)
IMMATURE GRANULOCYTES: 0 %
LYMPHOCYTES # BLD: 33 % (ref 24–43)
MCH RBC QN AUTO: 32.2 PG (ref 25.2–33.5)
MCHC RBC AUTO-ENTMCNC: 33.3 G/DL (ref 28.4–34.8)
MCV RBC AUTO: 96.6 FL (ref 82.6–102.9)
MONOCYTES # BLD: 9 % (ref 3–12)
NRBC AUTOMATED: 0 PER 100 WBC
PDW BLD-RTO: 12.7 % (ref 11.8–14.4)
PLATELET # BLD: 329 K/UL (ref 138–453)
PLATELET ESTIMATE: NORMAL
PMV BLD AUTO: 9.6 FL (ref 8.1–13.5)
POTASSIUM SERPL-SCNC: 4.3 MMOL/L (ref 3.7–5.3)
RBC # BLD: 4.47 M/UL (ref 4.21–5.77)
RBC # BLD: NORMAL 10*6/UL
SEG NEUTROPHILS: 56 % (ref 36–65)
SEGMENTED NEUTROPHILS ABSOLUTE COUNT: 5.74 K/UL (ref 1.5–8.1)
SODIUM BLD-SCNC: 137 MMOL/L (ref 135–144)
TOTAL PROTEIN: 7.1 G/DL (ref 6.4–8.3)
TSH SERPL DL<=0.05 MIU/L-ACNC: 2.79 MIU/L (ref 0.3–5)
WBC # BLD: 10.3 K/UL (ref 3.5–11.3)
WBC # BLD: NORMAL 10*3/UL

## 2021-09-28 PROCEDURE — 99213 OFFICE O/P EST LOW 20 MIN: CPT | Performed by: NURSE PRACTITIONER

## 2021-09-28 PROCEDURE — 3017F COLORECTAL CA SCREEN DOC REV: CPT | Performed by: NURSE PRACTITIONER

## 2021-09-28 PROCEDURE — 4004F PT TOBACCO SCREEN RCVD TLK: CPT | Performed by: NURSE PRACTITIONER

## 2021-09-28 PROCEDURE — G8427 DOCREV CUR MEDS BY ELIG CLIN: HCPCS | Performed by: NURSE PRACTITIONER

## 2021-09-28 PROCEDURE — G8419 CALC BMI OUT NRM PARAM NOF/U: HCPCS | Performed by: NURSE PRACTITIONER

## 2021-09-28 NOTE — PROGRESS NOTES
Visit Information    Have you changed or started any medications since your last visit including any over-the-counter medicines, vitamins, or herbal medicines? no   Are you having any side effects from any of your medications? -  no  Have you stopped taking any of your medications? Is so, why? -  no    Have you seen any other physician or provider since your last visit? Yes - Records Obtained  Have you had any other diagnostic tests since your last visit? No  Have you been seen in the emergency room and/or had an admission to a hospital since we last saw you? No  Have you had your routine dental cleaning in the past 6 months? no    Have you activated your [employer] account? If not, what are your barriers?  No:      Patient Care Team:  Gardenia Gasca MD as PCP - General (Internal Medicine)  Gardenia Gasca MD as PCP - [facility] Empaneled Provider    Medical History Review  Past Medical, Family, and Social History reviewed and does not contribute to the patient presenting condition    Health Maintenance   Topic Date Due    Hepatitis C screen  Never done    Pneumococcal 0-64 years Vaccine (1 of 2 - PPSV23) Never done    COVID-19 Vaccine (1) Never done    HIV screen  Never done    DTaP/Tdap/Td vaccine (1 - Tdap) Never done    Lipid screen  Never done    Colon cancer screen colonoscopy  Never done    Shingles Vaccine (1 of 2) Never done    Low dose CT lung screening  Never done    Flu vaccine (1) Never done    Hepatitis A vaccine  Aged Out    Hepatitis B vaccine  Aged Out    Hib vaccine  Aged Out    Meningococcal (ACWY) vaccine  Aged Out         [address] [address]  Dept: [phone]  Dept Fax: [phone]    OfficeProgress/Follow Up Note  Date of patient's visit: 9/29/2021  Patient's Name:  Arline Edwards YOB: 1960            Patient Care Team:  Gardenia Gasca MD as PCP - General (Internal Medicine)  Gardenia Gasca MD as PCP - [facility] facility-administered medications for this visit. ALLERGIES:      Allergies   Allergen Reactions    Lidoderm [Lidocaine]          SOCIAL HISTORY    Reviewed and no change from previous record. Deedee Schroeder  reports that he has been smoking cigarettes. He has a 45.00 pack-year smoking history. He has never used smokeless tobacco.    FAMILY HISTORY:    Reviewed and No change from previous visit    REVIEW OF SYSTEMS:    Review of Systems   Constitutional: Negative for appetite change, diaphoresis, fatigue, fever and unexpected weight change. HENT: Negative for ear pain, postnasal drip, rhinorrhea, sinus pain, sore throat and trouble swallowing. Eyes: Negative for visual disturbance. Respiratory: Negative for cough, chest tightness, shortness of breath and wheezing. Cardiovascular: Negative for chest pain, palpitations and leg swelling. Gastrointestinal: Negative for abdominal pain, blood in stool, constipation, diarrhea, nausea and vomiting. Endocrine: Negative for polydipsia, polyphagia and polyuria. Genitourinary: Negative for difficulty urinating, dysuria and flank pain. Musculoskeletal: Positive for arthralgias (R leg pain intermittent ). Negative for myalgias. Skin: Negative for rash and wound. Neurological: Negative for dizziness, syncope and weakness. All other systems reviewed and are negative. PHYSICAL EXAM:      Vitals:    09/28/21 1600   BP: 122/70   Site: Left Upper Arm   Pulse: 85   SpO2: 96%   Weight: 185 lb (83.9 kg)     BP Readings from Last 3 Encounters:   09/28/21 122/70   07/29/21 (!) 150/91   07/28/21 136/84      Wt Readings from Last 3 Encounters:   09/28/21 185 lb (83.9 kg)   07/29/21 195 lb (88.5 kg)   07/28/21 195 lb (88.5 kg)       Physical Exam  Vitals reviewed. Constitutional:       Appearance: Normal appearance. HENT:      Head: Normocephalic. Cardiovascular:      Rate and Rhythm: Normal rate and regular rhythm. Pulses: Normal pulses.       Heart sounds: Normal heart sounds. Pulmonary:      Effort: Pulmonary effort is normal.      Breath sounds: Normal breath sounds. Abdominal:      General: Bowel sounds are normal.      Palpations: Abdomen is soft. Musculoskeletal:         General: No swelling, tenderness or deformity. Normal range of motion. Cervical back: Normal range of motion. Skin:     General: Skin is warm and dry. Neurological:      General: No focal deficit present. Mental Status: He is alert and oriented to person, place, and time. Psychiatric:         Mood and Affect: Mood normal.         Behavior: Behavior normal.         Thought Content: Thought content normal.         Judgment: Judgment normal.          LABORATORY FINDINGS:    CBC:  Lab Results   Component Value Date    WBC 10.3 09/28/2021    HGB 14.4 09/28/2021     09/28/2021       BMP:    Lab Results   Component Value Date     09/28/2021    K 4.3 09/28/2021     09/28/2021    CO2 21 09/28/2021    BUN 19 09/28/2021    CREATININE 0.85 09/28/2021    GLUCOSE 93 09/28/2021       HEMOGLOBIN A1C: No results found for: LABA1C    FASTING LIPID PANEL:No results found for: CHOL, HDL, TRIG    ASSESSMENT AND PLAN:      1. Right leg pain  - continue supportive care measures and PT  - External Referral To Chiropractic    2. Right hip pain  - External Referral To Chiropractic    3. Screening for thyroid disorder  - TSH without Reflex; Future    4. Screening for diabetes mellitus  - Comprehensive Metabolic Panel; Future  - CBC With Auto Differential; Future      FOLLOW UP AND INSTRUCTIONS:   No follow-ups on file. [place] received counseling on the following healthy behaviors: nutrition, exercise and medication adherence    Discussed use, benefit, and side effects of prescribed medications. Barriers to medication compliance addressed. All patient questions answered. Patient voiced understanding.      Patient given educational materials - see patient instructions    ROLAND Pena - NEAL   [facility]  9/29/2021, 12:11 PM    Please note that this chart was generated using voice recognition Dragon dictation software. Although everyeffort was made to ensure the accuracy of this automated transcription, some errors in transcription may have occurred.

## 2021-09-29 ASSESSMENT — ENCOUNTER SYMPTOMS
COUGH: 0
SORE THROAT: 0
NAUSEA: 0
DIARRHEA: 0
CHEST TIGHTNESS: 0
SINUS PAIN: 0
TROUBLE SWALLOWING: 0
WHEEZING: 0
VOMITING: 0
BLOOD IN STOOL: 0
ABDOMINAL PAIN: 0
RHINORRHEA: 0
CONSTIPATION: 0
SHORTNESS OF BREATH: 0

## 2021-10-06 ENCOUNTER — HOSPITAL ENCOUNTER (OUTPATIENT)
Dept: PHYSICAL THERAPY | Age: 61
Setting detail: THERAPIES SERIES
Discharge: HOME | End: 2021-10-06
Payer: COMMERCIAL

## 2021-10-06 PROCEDURE — 97110 THERAPEUTIC EXERCISES: CPT

## 2021-10-06 NOTE — FLOWSHEET NOTE
[facility] Outpatient Physical Therapy   [address]   Phone: [phone]   Fax: [phone]    Physical Therapy Daily Treatment Note      Date:  10/6/2021  Patient Name:  Paola Womack    :  1960  MRN: [id number]  Physician:      Avril Beyer MD                  [facility]  # of visits allowed/remainin  Medical Diagnosis:   M25.551 (ICD-10-CM) - Right hip pain  Rehab Codes: M 54.41, R 26.2 NEC   Onset date:    21                        Next Dr's appt.:   Visit Count:                    Cancel/No Show: 0/0    Subjective  Pt comes to therapy noting great improvement in overall symptoms and function. Pt has recently been seeing a chiropractor who stated pt has clogged lymph nodes located in R groin/anterior hip region with good response to treatment. Pt is back to work full time with only mild difficulties performing kneeling activities. Pt states he has back to 75% function. Pt notes no pain at this time and only 1-2/10 pain at the end of work. Pt continues to do HEP once a day. Pain:  [x] Yes  [] No Location: travels between his R lumbar, groin, knee, and hip.    Pain Rating: (0-10 scale) 0/10   Pain altered Tx:  [] No  [] Yes  Action:  Comments:    Objective:  Modalities:   Precautions: significant standing/sitting tolerance deficit   Exercises: flexion biased   Exercise Reps/ Time Weight/ Level Completed  Today Comments   SKTC 5x10\"      LTR 10x10\"   Inc pain, encouraged small range    Active hamstring stretch 10x10\"             Supine BKFO 10x2 red     Supine Bridge  20x  3\" hold       Supine Bridge with hip abd  20x 3\" hold Red     Supine Bridge with Hip add  10x2  5\" volleyball      Supine hooklying SLR  10x2   Small range    Supine hookyling march with TrA 10x2 red     SKFO x20      Adductor stretch 10x3\"   Supine w/ belt           Figure 4 stretch 2x30\"  X sitting R LE, HEP   Piriformis stretch 2x30\"   L LE, HEP    SL in flex, SB, and rotation.  - MET  3. ? Strength: Pt will demonstrate improved R LE strength to 4-/5 or higher   - MET   4. Pt will demonstrate improved posture with functional tasks   - MET   5. ? Function: Pt will report decreased difficulty performing ADLs and requiring less rest breaks   - MET  6. Independent with Home Exercise Programs  - MET     LTG: (to be met in 12 treatments)  1. Pt will demonstrate lumbar ROM WNL to normalize mobility   2. Pt will report decreased pain 5/10 or less to perform standing for 30 minutes or more  3. Pt will demonstrate improved R LE strength to 4/5 or higher to ambulate with least restrictive AD for 45 minutes or more  4. Pt's balance goal to be created at re-assessment         Patient goals: get out of pain, go back to work, see life on a vertical not horizontal     Pt. Education:  [] Yes  [] No  [x] Reviewed Prior HEP/Ed  Method of Education: [x] Verbal  [x] Demo  [] Written  Comprehension of Education:  [x] Verbalizes understanding. [x] Demonstrates understanding. [] Needs review. [] Demonstrates/verbalizes HEP/Ed previously given. Access Code: [email or web address]  URL: [email or web address]  Date: 09/02/2021  Prepared by: Evelyn Hudson    Exercises  Hooklying Single Knee to Chest Stretch - 1 x daily - 7 x weekly - 1 sets - 10 reps - 10 sec hold  Supine Bridge - 1 x daily - 7 x weekly - 2 sets - 10 reps  Marching Bridge - 1 x daily - 7 x weekly - 2 sets - 10 reps  Supine Active Straight Leg Raise - 1 x daily - 7 x weekly - 2 sets - 10 reps  Supine Gluteal Sets - 1 x daily - 7 x weekly - 1 sets - 1 reps - 5 min hold  Bent Knee Fallouts - 1 x daily - 7 x weekly - 2 sets - 10 reps - 5 sec hold  Seated Alternating Side Stretch with Arm Overhead - 1 x daily - 7 x weekly - 3 sets - 10 reps  Seated Thoracic Lumbar Extension - 1 x daily - 7 x weekly - 3 sets - 10 reps    Plan: [x] Continue per plan of care.    [] Other:      Treatment Charges: Mins Units   []  Modalities     [x]  Ther Exercise 40 3   []  Manual Therapy     []  Ther Activities     []  Aquatics     []  Neuromuscular     [] Vasocompression     [] Gait Training     [] Dry needling        [] 1 or 2 muscles        [] 3 or more muscles     []  Other     Total Treatment time 40 3     Time In: 0605pm  Time Out:  645pm    Electronically signed by:  Radha Kunz PT

## 2021-10-10 ENCOUNTER — APPOINTMENT (OUTPATIENT)
Dept: GENERAL RADIOLOGY | Age: 61
End: 2021-10-10
Payer: COMMERCIAL

## 2021-10-10 ENCOUNTER — HOSPITAL ENCOUNTER (EMERGENCY)
Age: 61
Discharge: HOME | End: 2021-10-10
Attending: EMERGENCY MEDICINE
Payer: COMMERCIAL

## 2021-10-10 VITALS
TEMPERATURE: 98.3 F | OXYGEN SATURATION: 97 % | HEART RATE: 111 BPM | SYSTOLIC BLOOD PRESSURE: 146 MMHG | RESPIRATION RATE: 18 BRPM | DIASTOLIC BLOOD PRESSURE: 82 MMHG

## 2021-10-10 DIAGNOSIS — S41.111A LACERATION OF RIGHT UPPER EXTREMITY, INITIAL ENCOUNTER: Primary | ICD-10-CM

## 2021-10-10 DIAGNOSIS — S71.111A LACERATION OF RIGHT THIGH, INITIAL ENCOUNTER: ICD-10-CM

## 2021-10-10 PROCEDURE — 99283 EMERGENCY DEPT VISIT LOW MDM: CPT

## 2021-10-10 PROCEDURE — 73552 X-RAY EXAM OF FEMUR 2/>: CPT

## 2021-10-10 PROCEDURE — 6360000002 HC RX W HCPCS: Performed by: EMERGENCY MEDICINE

## 2021-10-10 PROCEDURE — 6370000000 HC RX 637 (ALT 250 FOR IP): Performed by: STUDENT IN AN ORGANIZED HEALTH CARE EDUCATION/TRAINING PROGRAM

## 2021-10-10 PROCEDURE — 73060 X-RAY EXAM OF HUMERUS: CPT

## 2021-10-10 PROCEDURE — 90715 TDAP VACCINE 7 YRS/> IM: CPT | Performed by: EMERGENCY MEDICINE

## 2021-10-10 PROCEDURE — 90471 IMMUNIZATION ADMIN: CPT | Performed by: EMERGENCY MEDICINE

## 2021-10-10 PROCEDURE — 2500000003 HC RX 250 WO HCPCS: Performed by: EMERGENCY MEDICINE

## 2021-10-10 RX ORDER — CEPHALEXIN 500 MG/1
500 CAPSULE ORAL 4 TIMES DAILY
Qty: 28 CAPSULE | Refills: 0 | Status: SHIPPED | OUTPATIENT
Start: 2021-10-10 | End: 2021-10-17

## 2021-10-10 RX ORDER — BUPIVACAINE HYDROCHLORIDE AND EPINEPHRINE 5; 5 MG/ML; UG/ML
30 INJECTION, SOLUTION PERINEURAL ONCE
Status: DISCONTINUED | OUTPATIENT
Start: 2021-10-10 | End: 2021-10-10 | Stop reason: RX

## 2021-10-10 RX ORDER — BUPIVACAINE HYDROCHLORIDE 5 MG/ML
30 INJECTION, SOLUTION EPIDURAL; INTRACAUDAL ONCE
Status: COMPLETED | OUTPATIENT
Start: 2021-10-10 | End: 2021-10-10

## 2021-10-10 RX ORDER — CEPHALEXIN 250 MG/1
500 CAPSULE ORAL ONCE
Status: COMPLETED | OUTPATIENT
Start: 2021-10-10 | End: 2021-10-10

## 2021-10-10 RX ADMIN — BUPIVACAINE HYDROCHLORIDE 150 MG: 5 INJECTION, SOLUTION EPIDURAL; INTRACAUDAL; PERINEURAL at 18:00

## 2021-10-10 RX ADMIN — CEPHALEXIN 500 MG: 250 CAPSULE ORAL at 20:01

## 2021-10-10 RX ADMIN — TETANUS TOXOID, REDUCED DIPHTHERIA TOXOID AND ACELLULAR PERTUSSIS VACCINE, ADSORBED 0.5 ML: 5; 2.5; 8; 8; 2.5 SUSPENSION INTRAMUSCULAR at 18:53

## 2021-10-10 ASSESSMENT — PAIN SCALES - GENERAL: PAINLEVEL_OUTOF10: 0

## 2021-10-10 ASSESSMENT — ENCOUNTER SYMPTOMS
ABDOMINAL PAIN: 0
SHORTNESS OF BREATH: 0
EYE PAIN: 0
BACK PAIN: 0
COLOR CHANGE: 0

## 2021-10-10 NOTE — ED PROVIDER NOTES
EMERGENCY DEPARTMENT ENCOUNTER    Pt Name: Jordy May  MRN: [id number]  [place] 1960  Date of evaluation: 10/10/21  CHIEF COMPLAINT       Chief Complaint   Patient presents with    Laceration     right arm and leg after mirror falling on him     HISTORY OF PRESENT ILLNESS   60-year-old male presents for laceration to right upper and right lower extremities. Patient reports he was moving a dresser with a mirror in it yesterday and the dresser fell the mirror broke and cut his arm and leg. Patient reports this happened approximately 11 PM last night, denies other injuries, is unsure of last tetanus status, denies any new numbness tingling weakness in the upper or lower extremities. The history is provided by the patient. REVIEW OF SYSTEMS     Review of Systems   Constitutional: Negative for chills and fever. HENT: Negative for congestion and ear pain. Eyes: Negative for pain. Respiratory: Negative for shortness of breath. Cardiovascular: Negative for chest pain, palpitations and leg swelling. Gastrointestinal: Negative for abdominal pain. Genitourinary: Negative for dysuria and flank pain. Musculoskeletal: Negative for back pain. Skin: Positive for wound. Negative for color change. Neurological: Negative for numbness and headaches. Psychiatric/Behavioral: Negative for confusion. All other systems reviewed and are negative. PASTMEDICAL HISTORY   No past medical history on file.   Past Problem List  Patient Active Problem List   Diagnosis Code    Smoker F17.200    FHx: colon cancer Z80.0     SURGICAL HISTORY       Past Surgical History:   Procedure Laterality Date    HERNIA REPAIR      TONSILLECTOMY       CURRENT MEDICATIONS       Discharge Medication List as of 10/10/2021  7:51 PM      CONTINUE these medications which have NOT CHANGED    Details   acetaminophen (TYLENOL) 500 MG tablet Take 2 tablets by mouth every 6 hours as needed for Pain, Disp-60 tablet, R-0Normal ALLERGIES     is allergic to lidoderm [lidocaine]. FAMILY HISTORY     He indicated that his mother is . He indicated that his father is . He indicated that the status of his maternal uncle is unknown. SOCIAL HISTORY       Social History     Tobacco Use    Smoking status: Current Every Day Smoker     Packs/day: 1.50     Years: 30.00     Pack years: 45.00     Types: Cigarettes    Smokeless tobacco: Never Used   Vaping Use    Vaping Use: Never used   Substance Use Topics    Alcohol use: Yes     Alcohol/week: 1.0 standard drinks     Types: 1 Cans of beer per week     Comment: 1 daily     Drug use: No     PHYSICAL EXAM     INITIAL VITALS: BP (!) 146/82   Pulse 111   Temp 98.3 °F (36.8 °C) (Oral)   Resp 18   SpO2 97%    Physical Exam  Vitals and nursing note reviewed. Constitutional:       Appearance: Normal appearance. HENT:      Head: Normocephalic and atraumatic. Right Ear: External ear normal.      Left Ear: External ear normal.      Nose: Nose normal.      Mouth/Throat:      Mouth: Mucous membranes are moist.   Eyes:      Pupils: Pupils are equal, round, and reactive to light. Cardiovascular:      Rate and Rhythm: Normal rate and regular rhythm. Pulses: Normal pulses. Radial pulses are 2+ on the right side and 2+ on the left side. Dorsalis pedis pulses are 2+ on the right side and 2+ on the left side. Heart sounds: Normal heart sounds. Pulmonary:      Effort: Pulmonary effort is normal.      Breath sounds: Normal breath sounds. Abdominal:      General: Abdomen is flat. Palpations: Abdomen is soft. Tenderness: There is no abdominal tenderness. Musculoskeletal:         General: No tenderness. Normal range of motion. Right upper arm: Laceration present. Arms:       Cervical back: Neck supple. Legs:    Skin:     General: Skin is warm and dry. Capillary Refill: Capillary refill takes less than 2 seconds. Neurological:      General: No focal deficit present. Mental Status: He is alert and oriented to person, place, and time. Psychiatric:         Behavior: Behavior normal.         MED[employer] DECISION MAKIN-year-old male presents for complaint of laceration to right upper extremity right thigh and abrasion to right lower extremity. On initial exam patient in no acute distress vitals are stable, patient found to have a large laceration to the right upper and right lower extremities, will require repair, will update tetanus check x-rays to rule out foreign body given lacerations were from broken glass    X-rays are reviewed and negative for foreign body or other acute process    Patient's tetanus was updated    Lacerations were repaired, procedures were performed by ED resident, see procedure note for laceration repair, I was present for key aspects of the procedure, resident did not partake any other care of this patient      Discussed with patient suture care, discussed need for suture removal in 7 to 10 days, discussed return precautions, patient voiced understanding and is comfortable with plan and discharge home    Patient/Guardian was informed of their diagnosis and told to follow up with PCP & in 1-3 days. Patient demonstrates understanding and agreement with the plan. They were given the opportunity to ask questions and those questions were answered to the best of our ability with the available information. Patient/Guardian told to return to the ED for any new, worsening, changing or persistent symptoms. This dictation was prepared using [employer] voice recognition software.        CRITICAL CARE:       PROCEDURES:    Procedures    DIAGNOSTIC RESULTS   EKG:All EKG's are interpreted by the Emergency Department Physician who either signs or Co-signs this chart in the absence of a cardiologist.        RADIOLOGY:All plain film, CT, MRI, and formal ultrasound images (except ED bedside ultrasound) are read by the radiologist, see reports below, unless otherwisenoted in MDM or here. XR HUMERUS RIGHT (MIN 2 VIEWS)   Final Result   No acute bony abnormalities are noted         XR FEMUR RIGHT (MIN 2 VIEWS)   Final Result   No acute bony abnormalities are noted           LABS: All lab results were reviewed by myself, and all abnormals are listed below. Labs Reviewed - No data to display    EMERGENCY DEPARTMENTCOURSE:         Vitals:    Vitals:    10/10/21 1415   BP: (!) 146/82   Pulse: 111   Resp: 18   Temp: 98.3 °F (36.8 °C)   TempSrc: Oral   SpO2: 97%       The patient was given the following medications while in the emergency department:  Orders Placed This Encounter   Medications    Tetanus-Diphth-Acell Pertussis (BOOSTRIX) injection 0.5 mL    DISCONTD: bupivacaine-EPINEPHrine (MARCAINE-w/EPINEPHRINE) 0.5% -1:[id number] injection 30 mL    bupivacaine (PF) (MARCAINE) 0.5 % injection 150 mg    cephALEXin (KEFLEX) 500 MG capsule     Sig: Take 1 capsule by mouth 4 times daily for 7 days     Dispense:  28 capsule     Refill:  0    cephALEXin (KEFLEX) capsule 500 mg     Order Specific Question:   Antimicrobial Indications     Answer:   Skin and Soft Tissue Infection     CONSULTS:  None    FINAL IMPRESSION      1. Laceration of right upper extremity, initial encounter    2.  Laceration of right thigh, initial encounter          DISPOSITION/PLAN   DISPOSITION Decision To Discharge 10/10/2021 07:48:20 PM      PATIENT REFERRED TO:  Sonya Yoo, [facility]  [phone]    Schedule an appointment as soon as possible for a visit   For suture removal, For wound re-check    [facility] ED  [place] [id number] [phone]    As needed, If symptoms worsen, For suture removal, For wound re-check    DISCHARGE MEDICATIONS:  Discharge Medication List as of 10/10/2021  7:51 PM      START taking these medications    Details   cephALEXin (KEFLEX) 500 MG capsule Take 1 capsule by mouth 4 times daily for 7 days, Disp-28 capsule, R-0Print           The care is provided during an unprecedented national emergency due to the novel coronavirus, COVID 19.   Abbe Schwab, DO  Attending Emergency Physician                  Abbe Schwab, DO  10/11/21 0700

## 2021-10-10 NOTE — ED TRIAGE NOTES
Mode of arrival (squad #, walk in, police, etc) : walk in        Chief complaint(s): Laceration        Arrival Note (brief scenario, treatment PTA, etc). : Lacerations to right arm and leg after mirror falling on him yesterday. Pt attempt to dress arm wound today and noticed bleeding. Pt A&Ox4,NAD, respirations even and unlabored with no increased WOB or SOB, ambulatory with steady gait. C= \"Have you ever felt that you should Cut down on your drinking? \"  No  A= \"Have people Annoyed you by criticizing your drinking? \"  No  G= \"Have you ever felt bad or Guilty about your drinking? \"  No  E= \"Have you ever had a drink as an Eye-opener first thing in the morning to steady your nerves or to help a hangover? \"  No      Deferred []      Reason for deferring: N/A    *If yes to two or more: probable alcohol abuse. *

## 2021-10-11 NOTE — PROCEDURES
PROCEDURE NOTE - LACERATION CLOSURE    PATIENT NAME: [facility]  MEDICAL RECORD NO. [id number]  DATE: 10/10/2021  ATTENDING PHYSICIAN: Dr. Carolin Lowery DIAGNOSIS: Laceration(s) as follows:  -Location: Right upper arm  -Length: 12 cm  -Layered closure: No    POSTOPERATIVE DIAGNOSIS:  Same  PROCEDURE PERFORMED:  Suture closure of laceration  PERFORMING PHYSICIAN: Clay Vallejo DO  ANESTHESIA:  Local utilizing  Bupivacaine 0.25% with epinephrine  ESTIMATED BLOOD LOSS:  Less than 25 ml. DISCUSSION:  Dorie Adkins is a 63y.o.-year-old male. Patient requires laceration repair. The history and physical examination were reviewed and confirmed. CONSENT: The patient provided verbal consent for this procedure. PROCEDURE:  Prior to starting, the procedure and patient were confirmed by those present. The wound area was irrigated with sterile saline, cleansed with chlorhexidine gluconate and draped in a sterile fashion. The wound area was anesthetized with Bupivacaine 0.5% without epinephrine. The wound was explored with the following results No foreign bodies found. The wound was repaired with 4-0 Ethilon using interrupted sutures. The wound was dressed with bacitracin and gauze. All sponge, instrument and needle counts were correct at the completion of the procedure. The patient tolerated the procedure well.      SUTURE COUNT:  Suture count: 29    COMPLICATIONS:  None     Clay Vallejo DO  5:00 PM, 10/10/21          PROCEDURE NOTE - LACERATION CLOSURE    PATIENT [facility]: [facility]  MEDICAL RECORD NO. [id number]  DATE: 10/10/2021  ATTENDING PHYSICIAN: Dr. Carolin Lowery DIAGNOSIS: Laceration(s) as follows:  -Location: Right anterior thigh2  -Length: 6 cm  -Layered closure: No    POSTOPERATIVE DIAGNOSIS:  Same  PROCEDURE PERFORMED:  Suture closure of laceration  PERFORMING PHYSICIAN: Clay Vallejo DO  ANESTHESIA:  Local utilizing  Bupivacaine 0.25% without epinephrine  ESTIMATED BLOOD LOSS:  Less than 25 ml. DISCUSSION:  Jordy May is a 63y.o.-year-old male. Patient requires laceration repair. The history and physical examination were reviewed and confirmed. CONSENT: The patient provided verbal consent for this procedure. PROCEDURE:  Prior to starting, the procedure and patient were confirmed by those present. The wound area was irrigated with sterile saline, cleansed with chlorhexidine gluconate and draped in a sterile fashion. The wound area was anesthetized with Bupivacaine 0.5% without epinephrine. The wound was explored with the following results No foreign bodies found. The wound was repaired with 4-0 Ethilon using interrupted sutures. The wound was dressed with bacitracin and gauze. All sponge, instrument and needle counts were correct at the completion of the procedure. The patient tolerated the procedure well.      SUTURE COUNT:  Suture count: 12    COMPLICATIONS:  None     Gardenia Agrawal DO  5:03 PM, 10/10/21

## 2021-10-11 NOTE — ED NOTES
Pt's wounds dressed w/ bulky dressing by MEIR Eubanks, pt tolerated well.       Orville Phalen, RN  10/10/21 2016

## 2021-10-13 ENCOUNTER — HOSPITAL ENCOUNTER (OUTPATIENT)
Dept: PHYSICAL THERAPY | Age: 61
Setting detail: THERAPIES SERIES
Discharge: HOME | End: 2021-10-13
Payer: COMMERCIAL

## 2021-10-13 NOTE — FLOWSHEET NOTE
[] [facility]) [facility] &  Therapy  [address]    [phone]  F: [phone]   [] [address] [facility] [address]   [address]  P: [phone]  F: [phone]  [] [address] &  Therapy  [address] [facility]  P: [phone]  F: [phone] [] [address]  P: [phone]  F: [phone]  [] [address]  [facility]   [address]   [place]: [phone]  F: [phone]   [x] [address]  [place]: [phone]   F: [phone]     Physical Therapy Cancel/No Show note    Date: 10/13/2021  Patient: Emmie Jalloh  : 1960  MRN: [id number]    Cancels/No Shows to date:     For today's appointment patient:    []  Cancelled    [] Rescheduled appointment    [x] No-show     Reason given by patient:    []  Patient ill    []  Conflicting appointment    [] No transportation      [] Conflict with work    [x] No reason given    [] Weather related    [] COVID-19    [] Other:      Comments:        [] Next appointment was confirmed    Electronically signed by: Kelly Youssef, PT

## 2021-10-19 ENCOUNTER — OFFICE VISIT (OUTPATIENT)
Dept: INTERNAL MEDICINE CLINIC | Age: 61
End: 2021-10-19
Payer: COMMERCIAL

## 2021-10-19 VITALS
SYSTOLIC BLOOD PRESSURE: 132 MMHG | BODY MASS INDEX: 25.47 KG/M2 | HEART RATE: 101 BPM | DIASTOLIC BLOOD PRESSURE: 72 MMHG | HEIGHT: 72 IN | WEIGHT: 188 LBS | OXYGEN SATURATION: 96 %

## 2021-10-19 DIAGNOSIS — Z48.02 VISIT FOR SUTURE REMOVAL: Primary | ICD-10-CM

## 2021-10-19 PROCEDURE — G8427 DOCREV CUR MEDS BY ELIG CLIN: HCPCS | Performed by: NURSE PRACTITIONER

## 2021-10-19 PROCEDURE — G8419 CALC BMI OUT NRM PARAM NOF/U: HCPCS | Performed by: NURSE PRACTITIONER

## 2021-10-19 PROCEDURE — G8484 FLU IMMUNIZE NO ADMIN: HCPCS | Performed by: NURSE PRACTITIONER

## 2021-10-19 PROCEDURE — 99213 OFFICE O/P EST LOW 20 MIN: CPT | Performed by: NURSE PRACTITIONER

## 2021-10-19 PROCEDURE — 4004F PT TOBACCO SCREEN RCVD TLK: CPT | Performed by: NURSE PRACTITIONER

## 2021-10-19 PROCEDURE — 3017F COLORECTAL CA SCREEN DOC REV: CPT | Performed by: NURSE PRACTITIONER

## 2021-10-19 ASSESSMENT — ENCOUNTER SYMPTOMS
WHEEZING: 0
SHORTNESS OF BREATH: 0
COUGH: 0

## 2021-10-19 NOTE — PATIENT INSTRUCTIONS
Patient Education        Learning About Stitches and Staples Removal  When are stitches and staples removed? Your doctor will tell you when to have your stitches or staples removed, usually in 7 to 14 days. How long you'll be told to wait will depend on things like where the wound is located, how big and how deep the wound is, and what your general health is like. Do not remove the stitches on your own. Stitches on the face are usually removed within a week. But stitches and staples on other areas of the body, such as on the back or belly or over a joint, may need to stay in place longer, often a week or two. Be sure to follow your doctor's instructions. How are stitches and staples removed? It usually doesn't hurt when the doctor removes the stitches or staples. You may feel a tug as each stitch or staple is removed. · You will either be seated or lying down. · To remove stitches, the doctor will use scissors to cut each of the knots and then pull the threads out. · To remove staples, the doctor will use a tool to take out the staples one at a time. · The area may still feel tender after the stitches or staples are gone. But it should feel better within a few minutes or up to a few hours. What can you expect after stitches and staples are removed? Depending on the type and location of the cut, you will have a scar. Scars usually fade over time. Keep the area clean, but you won't need a bandage. When should you call for help? Call your doctor now or seek immediate medical care if :  · You have new pain, or your pain gets worse. · You have trouble moving the area near the scar. · You have symptoms of infection, such as:  ? Increased pain, swelling, warmth, or redness around the scar. ? Red streaks leading from the scar. ? Pus draining from the scar. ? A fever. Watch closely for changes in your health, and be sure to contact your doctor if:   · The scar opens.   · You do not get better as expected. Follow-up care is a key part of your treatment and safety. Be sure to make and go to all appointments, and call your doctor if you do not get better as expected. It's also a good idea to keep a list of the medicines you take. Where can you learn more? Go to [email or web address].[email or web address] org and sign in to your [facility] account. Enter C136 in the Redic[employer] box to learn more about \"Learning About Stitches and Staples Removal.\"     If you do not have an account, please click on the \"Sign Up Now\" link. Current as of: March 3, 2021               Content Version: 13.0  © 2006-2021 [employer]. Care instructions adapted under license by [facility] ([facility]). If you have questions about a medical condition or this instruction, always ask your healthcare professional. [address] any warranty or liability for your use of this information.

## 2021-10-19 NOTE — PROGRESS NOTES
Visit Information    Have you changed or started any medications since your last visit including any over-the-counter medicines, vitamins, or herbal medicines? no   Are you having any side effects from any of your medications? -  no  Have you stopped taking any of your medications? Is so, why? -  no    Have you seen any other physician or provider since your last visit? Yes - Records Requested  Have you had any other diagnostic tests since your last visit? Yes - Records Requested  Have you been seen in the emergency room and/or had an admission to a hospital since we last saw you? Yes - Records Requested  Have you had your routine dental cleaning in the past 6 months? no    Have you activated your [employer] account? If not, what are your barriers?  No: inactive     Patient Care Team:  Thais Mcdaniel MD as PCP - General (Internal Medicine)  Thais Mcdaniel MD as PCP - [facility]    Medical History Review  Past Medical, Family, and Social History reviewed and does contribute to the patient presenting condition    Health Maintenance   Topic Date Due    Hepatitis C screen  Never done    Pneumococcal 0-64 years Vaccine (1 of 2 - PPSV23) Never done    COVID-19 Vaccine (1) Never done    HIV screen  Never done    Lipid screen  Never done    Colon cancer screen colonoscopy  Never done    Shingles Vaccine (1 of 2) Never done    Low dose CT lung screening  Never done    Flu vaccine (1) Never done    DTaP/Tdap/Td vaccine (2 - Td or Tdap) 10/10/2031    Hepatitis A vaccine  Aged Out    Hepatitis B vaccine  Aged Out    Hib vaccine  Aged Out    Meningococcal (ACWY) vaccine  Aged Out         [address] [address]  Dept: [phone]  Dept Fax: [phone]    Office Progress/Follow Up Note  Date of patient's visit: 10/19/2021   Patient's Name:  Lorena Tellez  YOB: 1960            Patient Care Team:  Thais Mcdaniel MD as PCP - General (Internal Medicine)  Lilly Zavala MD as PCP - [facility] Provider    REASON FOR VISIT: ED Follow-up (STC 10/10 laceration RUE and RLE) and Suture / Staple Removal        HISTORY OF PRESENT ILLNESS:      History was obtained from the patient. Juice Martin is a 57 y.o. is here for ED Follow-up (STC 10/10 laceration RUE and RLE) and Suture / Staple Removal    Patient is seen today for suture removal.  He was lifting a mirror which fell on him and cut his right arm and right thigh. He was seen in the ER, was given a prescription for course of Keflex which he states he has not taken because he did not think that he needed it. Has had a small amount of bloody drainage from the lacerations, no warmth, small amount of erythema surrounding laceration to thigh. Patient Active Problem List   Diagnosis    Smoker    FHx: colon cancer       Allergies   Allergen Reactions    Lidoderm [Lidocaine]          MEDICATIONS:     No current outpatient medications on file. No current facility-administered medications for this visit. SOCIAL HISTORY    Reviewed and updated. Corine Dominguez  reports that he has been smoking cigarettes. He has a 45.00 pack-year smoking history. He has never used smokeless tobacco.    FAMILY HISTORY:    Reviewed and updated. family history includes Cancer in his maternal uncle; Colon Cancer in his mother; Heart Disease in his father. REVIEW OF SYSTEMS:      Review of Systems   Constitutional: Negative for chills, fatigue and fever. Respiratory: Negative for cough, shortness of breath and wheezing. Cardiovascular: Negative for chest pain, palpitations and leg swelling. Skin: Positive for wound. Psychiatric/Behavioral: The patient is not nervous/anxious.          PHYSICAL EXAM:      Vitals:    10/19/21 1447   BP: 132/72   Site: Left Upper Arm   Position: Sitting   Pulse: 101   SpO2: 96%   Weight: 188 lb (85.3 kg)   Height: 6' (1.829 m)     BP Readings from Last 3 Encounters:   10/19/21 132/72   10/10/21 (!) 146/82   09/28/21 122/70        Physical Exam  Constitutional:       Appearance: Normal appearance. HENT:      Head: Normocephalic and atraumatic. Right Ear: External ear normal.      Left Ear: External ear normal.      Nose: Nose normal.   Eyes:      Conjunctiva/sclera: Conjunctivae normal.   Cardiovascular:      Rate and Rhythm: Normal rate and regular rhythm. Pulses: Normal pulses. Pulmonary:      Effort: Pulmonary effort is normal.      Breath sounds: Normal breath sounds. Skin:     Findings: Laceration present. Comments: Sutures removed without difficulty from right leg and right upper arm lacerations. Small amount of erythema around lacerations, no odor or warmth   Neurological:      Mental Status: He is alert. Psychiatric:         Mood and Affect: Mood normal.         Behavior: Behavior normal.          LABORATORY FINDINGS:    CBC:   Lab Results   Component Value Date    WBC 10.3 09/28/2021    HGB 14.4 09/28/2021     09/28/2021     BMP:   Lab Results   Component Value Date     09/28/2021    K 4.3 09/28/2021     09/28/2021    CO2 21 09/28/2021    BUN 19 09/28/2021    CREATININE 0.85 09/28/2021    GLUCOSE 93 09/28/2021     HEMOGLOBIN A1C: No results found for: LABA1C  FASTING LIPID PANEL: No results found for: CHOL, HDL, LDLCHOLESTEROL, TRIG    ASSESSMENT AND PLAN:      Visit Diagnoses and Associated Orders     Visit for suture removal    -  Primary    Sutures removed, steri strips applied. Advised on S/S of infection. FOLLOW UP AND INSTRUCTIONS:     Return if symptoms worsen or fail to improve. · Devyn Jhon received counseling on the following healthy behaviors: wound care    · All patient questions answered. Pt voiced understanding.      · Patient given educational materials - see patient instructions    ROLAND Fall CNP    10/31/2021, 4:15 PM    Please note that this chart was generated using voice recognition Dragon dictation software. Although every effort was made to ensure the accuracy of this automatedtranscription, some errors in transcription may have occurred.

## 2021-10-28 ENCOUNTER — HOSPITAL ENCOUNTER (OUTPATIENT)
Dept: PHYSICAL THERAPY | Age: 61
Setting detail: THERAPIES SERIES
Discharge: HOME | End: 2021-10-28
Payer: COMMERCIAL

## 2021-10-29 NOTE — DISCHARGE SUMMARY
[x] [facility] ([facility]) @ [facility]  [address]  [place], [address] [address]  Phone [phone]  Fax [phone]    Physical Therapy Discharge Note    Date: 10/29/2021      Patient: Gio Scanlon  : 1960  MRN: [id number]    EJONWENMJ:      WLNRQS Kingsley Crawford MD                  Insurance: [place]   Auth: John Guerra  # of visits allowed/remainin  Medical Diagnosis:   M25.551 (ICD-10-CM) - Right hip pain  Rehab Codes: M 54.41, R 26.2 NEC   Onset date:    21                        Next Dr's appt.:   Visit Count:                    Cancel/No Show: 0/0                 [] Patient recovered from conditions. Treatment goals were met. [x] Patient received maximum benefit. No further therapy indicated at this time. [] Patient demonstrated improvement from condition with  ** Of  ** Short term goals met. []Patient demonstrated improvement from condition with **   Of **  Long term goals met. [] Patient to continue exercise/home instructions independently. [] Therapy interrupted due to:    [] Patient has 2 or more no shows/cancels, is discontinued per our policy. [] Patient has completed prescribed number of treatment sessions. [] Other:      It Is My Understanding That The:  [] Patient returned to work. [x] Patient demonstrated improved level of function. [] Patient returned to previous functional level.   [] Patient's current functional status is unknown due to no-shows  [] Other:         Treatment Included:     [x] Therapeutic Exercise   84185  [] Iontophoresis: 4 mg/mL Dexamethasone Sodium Phosphate  mAmin  00392   [] Therapeutic Activity  48521 [] Vasopneumatic cold with compression  46269    [] Gait Training   32687 [] Ultrasound   26133   [x] Neuromuscular Re-education  32834 [] Electrical Stimulation Unattended  26190   [x] Manual Therapy  61259 [] Electrical Stimulation Attended     [x] Instruction in HEP  [] Lumbar/Cervical Traction  [id number]   [] Aquatic Therapy   [id number] [x] Cold/hotpack    [] Massage   [id number]      [] Dry Needling, 1 or 2 muscles  89049   [] Biofeedback, first 15 minutes   68790  [] Biofeedback, additional 15 minutes   18518 [] Dry Needling, 3 or more muscles  56471                If you have any questions or concerns regarding this patient's care, please contact us.    Thank you for your referral.      Electronically signed by: Gale Mcbride PT

## 2023-09-08 ENCOUNTER — OFFICE VISIT (OUTPATIENT)
Dept: INTERNAL MEDICINE CLINIC | Age: 63
End: 2023-09-08
Payer: COMMERCIAL

## 2023-09-08 VITALS
OXYGEN SATURATION: 99 % | SYSTOLIC BLOOD PRESSURE: 120 MMHG | HEIGHT: 72 IN | DIASTOLIC BLOOD PRESSURE: 80 MMHG | HEART RATE: 78 BPM | WEIGHT: 185 LBS | BODY MASS INDEX: 25.06 KG/M2

## 2023-09-08 DIAGNOSIS — Z72.89 OTHER PROBLEMS RELATED TO LIFESTYLE: ICD-10-CM

## 2023-09-08 DIAGNOSIS — Z11.4 SCREENING FOR HIV WITHOUT PRESENCE OF RISK FACTORS: ICD-10-CM

## 2023-09-08 DIAGNOSIS — Z11.59 NEED FOR HEPATITIS C SCREENING TEST: ICD-10-CM

## 2023-09-08 DIAGNOSIS — Z13.220 SCREENING FOR HYPERLIPIDEMIA: ICD-10-CM

## 2023-09-08 DIAGNOSIS — L91.8 SKIN TAG: ICD-10-CM

## 2023-09-08 DIAGNOSIS — Z00.00 ENCOUNTER FOR WELL ADULT EXAM WITHOUT ABNORMAL FINDINGS: Primary | ICD-10-CM

## 2023-09-08 PROCEDURE — 99386 PREV VISIT NEW AGE 40-64: CPT | Performed by: INTERNAL MEDICINE

## 2023-09-08 SDOH — ECONOMIC STABILITY: FOOD INSECURITY: WITHIN THE PAST 12 MONTHS, YOU WORRIED THAT YOUR FOOD WOULD RUN OUT BEFORE YOU GOT MONEY TO BUY MORE.: NEVER TRUE

## 2023-09-08 SDOH — ECONOMIC STABILITY: HOUSING INSECURITY
IN THE LAST 12 MONTHS, WAS THERE A TIME WHEN YOU DID NOT HAVE A STEADY PLACE TO SLEEP OR SLEPT IN A SHELTER (INCLUDING NOW)?: NO

## 2023-09-08 SDOH — ECONOMIC STABILITY: INCOME INSECURITY: HOW HARD IS IT FOR YOU TO PAY FOR THE VERY BASICS LIKE FOOD, HOUSING, MEDICAL CARE, AND HEATING?: NOT HARD AT ALL

## 2023-09-08 SDOH — ECONOMIC STABILITY: FOOD INSECURITY: WITHIN THE PAST 12 MONTHS, THE FOOD YOU BOUGHT JUST DIDN'T LAST AND YOU DIDN'T HAVE MONEY TO GET MORE.: NEVER TRUE

## 2023-09-08 ASSESSMENT — PATIENT HEALTH QUESTIONNAIRE - PHQ9
SUM OF ALL RESPONSES TO PHQ9 QUESTIONS 1 & 2: 0
1. LITTLE INTEREST OR PLEASURE IN DOING THINGS: 0
2. FEELING DOWN, DEPRESSED OR HOPELESS: 0
SUM OF ALL RESPONSES TO PHQ QUESTIONS 1-9: 0

## 2023-09-08 ASSESSMENT — ENCOUNTER SYMPTOMS
ABDOMINAL PAIN: 0
FACIAL SWELLING: 0
CHEST TIGHTNESS: 0
CONSTIPATION: 0
APNEA: 0
DIARRHEA: 0
COUGH: 0
COLOR CHANGE: 0
WHEEZING: 0
ABDOMINAL DISTENTION: 0
SHORTNESS OF BREATH: 0
BACK PAIN: 0

## 2023-09-08 NOTE — PROGRESS NOTES
Well Adult Note  Name: Rozina Potts Date: 2023   MRN: [id number] Sex: Male   Age: 60 y.o. Ethnicity:  / Fabiana Jen   : 1960 Race: White (non-)      Richelle Hernandez is here for well adult exam.  History:  Patient is here for Annual Wellness exam   Refusing Flu , Pneumonia Shot   Refusing Colon cancer and Lung Cancer Screning   Working on cutting smoking   Requesting referral to skin Physician with having skin tag in front of left eye     Review of Systems   Constitutional:  Negative for activity change, appetite change, chills and diaphoresis. HENT:  Negative for congestion, dental problem, ear discharge, facial swelling and hearing loss. Respiratory:  Negative for apnea, cough, chest tightness, shortness of breath and wheezing. Cardiovascular:  Negative for chest pain and leg swelling. Gastrointestinal:  Negative for abdominal distention, abdominal pain, constipation and diarrhea. Genitourinary:  Negative for difficulty urinating, dysuria, enuresis, flank pain and frequency. Musculoskeletal:  Negative for arthralgias, back pain, gait problem and joint swelling. Skin:  Negative for color change, pallor and rash. Skin tag in front of Rt left eye   Neurological:  Negative for dizziness, seizures, facial asymmetry, light-headedness, numbness and headaches. Psychiatric/Behavioral:  Negative for agitation, behavioral problems, confusion, decreased concentration and dysphoric mood. Allergies   Allergen Reactions    Lidoderm [Lidocaine]          Prior to Visit Medications    Not on File       No past medical history on file.     Past Surgical History:   Procedure Laterality Date    HERNIA REPAIR      TONSILLECTOMY           Family History   Problem Relation Age of Onset    Colon Cancer Mother         2005    Heart Disease Father     Cancer Maternal Uncle        Social History     Tobacco Use    Smoking status: Every Day     Packs/day: 1.50     Years: 30.00

## 2023-09-08 NOTE — PROGRESS NOTES
Subjective:      Patient ID: Eliezer Camacho is a 60 y.o. male. HPI    Review of Systems    Objective:   Physical Exam    Assessment / Plan:         Visit Information    Have you changed or started any medications since your last visit including any over-the-counter medicines, vitamins, or herbal medicines? no   Are you having any side effects from any of your medications? -  no  Have you stopped taking any of your medications? Is so, why? -  no    Have you seen any other physician or provider since your last visit? No  Have you had any other diagnostic tests since your last visit? No  Have you been seen in the emergency room and/or had an admission to a hospital since we last saw you? No  Have you had your routine dental cleaning in the past 6 months? no    Have you activated your [employer] account? If not, what are your barriers?  No:      Patient Care Team:  Marylen Dress, MD as PCP - General (Internal Medicine)  Marylen Dress, MD as PCP - Empaneled Provider    Medical History Review  Past Medical, Family, and Social History reviewed and does contribute to the patient presenting condition    Health Maintenance   Topic Date Due    COVID-19 Vaccine (1) Never done    Hepatitis A vaccine (1 of 2 - Risk 2-dose series) Never done    Pneumococcal 0-64 years Vaccine (1 - PCV) Never done    Depression Screen  Never done    HIV screen  Never done    Hepatitis C screen  Never done    Hepatitis B vaccine (1 of 3 - Risk 3-dose series) Never done    Lipids  Never done    Colorectal Cancer Screen  Never done    Shingles vaccine (1 of 2) Never done    Flu vaccine (1) Never done    DTaP/Tdap/Td vaccine (2 - Td or Tdap) 10/10/2031    Hib vaccine  Aged Out    Meningococcal (ACWY) vaccine  Aged Out

## 2023-09-08 NOTE — PATIENT INSTRUCTIONS
Advance Directives: Care Instructions  Overview  An advance directive is a legal way to state your wishes at the end of your life. It tells your family and your doctor what to do if you can't say what you want. There are two main types of advance directives. You can change them any time your wishes change. Living will. This form tells your family and your doctor your wishes about life support and other treatment. The form is also called a declaration. Medical power of . This form lets you name a person to make treatment decisions for you when you can't speak for yourself. This person is called a health care agent (health care proxy, health care surrogate). The form is also called a durable power of  for health care. If you do not have an advance directive, decisions about your medical care may be made by a family member, or by a doctor or a  who doesn't know you. It may help to think of an advance directive as a gift to the people who care for you. If you have one, they won't have to make tough decisions by themselves. For more information, including forms for your state, see the [address] [place] [address] website (www.caringinfo.org/planning/advance-directives/). Follow-up care is a key part of your treatment and safety. Be sure to make and go to all appointments, and call your doctor if you are having problems. It's also a good idea to know your test results and keep a list of the medicines you take. What should you include in an advance directive? Many states have a unique advance directive form. (It may ask you to address specific issues.) Or you might use a universal form that's approved by many states. If your form doesn't tell you what to address, it may be hard to know what to include in your advance directive. Use the questions below to help you get started. Who do you want to make decisions about your medical care if you are not able to?   What life-support measures do you want if you

## 2023-09-14 ENCOUNTER — HOSPITAL ENCOUNTER (OUTPATIENT)
Age: 63
Setting detail: SPECIMEN
Discharge: HOME | End: 2023-09-14

## 2023-09-14 DIAGNOSIS — Z11.4 SCREENING FOR HIV WITHOUT PRESENCE OF RISK FACTORS: ICD-10-CM

## 2023-09-14 DIAGNOSIS — Z72.89 OTHER PROBLEMS RELATED TO LIFESTYLE: ICD-10-CM

## 2023-09-14 DIAGNOSIS — Z13.220 SCREENING FOR HYPERLIPIDEMIA: ICD-10-CM

## 2023-09-14 DIAGNOSIS — Z11.59 NEED FOR HEPATITIS C SCREENING TEST: ICD-10-CM

## 2023-09-14 LAB
CHOLEST SERPL-MCNC: 227 MG/DL (ref 0–199)
CHOLESTEROL/HDL RATIO: 5
HCV AB SERPL QL IA: NONREACTIVE
HDLC SERPL-MCNC: 42 MG/DL (ref 0–40)
LDLC SERPL CALC-MCNC: 159 MG/DL (ref 0–100)
TRIGL SERPL-MCNC: 128 MG/DL (ref 0–149)
VLDLC SERPL CALC-MCNC: 26 MG/DL

## 2023-09-15 ENCOUNTER — TELEPHONE (OUTPATIENT)
Dept: INTERNAL MEDICINE CLINIC | Age: 63
End: 2023-09-15

## 2023-09-15 DIAGNOSIS — E78.5 HYPERLIPIDEMIA, UNSPECIFIED HYPERLIPIDEMIA TYPE: Primary | ICD-10-CM

## 2023-09-15 LAB — HIV 1+2 AB+HIV1 P24 AG SERPL QL IA: NONREACTIVE

## 2023-09-15 RX ORDER — ATORVASTATIN CALCIUM 20 MG/1
20 TABLET, FILM COATED ORAL DAILY
Qty: 30 TABLET | Refills: 3 | Status: SHIPPED | OUTPATIENT
Start: 2023-09-15

## 2023-09-15 NOTE — TELEPHONE ENCOUNTER
Patient says he is not interested in taking the cholesterol medication. Patient says he will try to watch what he is eating.

## 2024-01-08 ENCOUNTER — OFFICE VISIT (OUTPATIENT)
Dept: DERMATOLOGY | Age: 64
End: 2024-01-08
Payer: COMMERCIAL

## 2024-01-08 VITALS
WEIGHT: 190 LBS | TEMPERATURE: 97.9 F | BODY MASS INDEX: 25.73 KG/M2 | SYSTOLIC BLOOD PRESSURE: 116 MMHG | OXYGEN SATURATION: 97 % | HEART RATE: 76 BPM | DIASTOLIC BLOOD PRESSURE: 74 MMHG | HEIGHT: 72 IN

## 2024-01-08 DIAGNOSIS — L91.8 INFLAMED ACROCHORDON: Primary | ICD-10-CM

## 2024-01-08 PROCEDURE — G8484 FLU IMMUNIZE NO ADMIN: HCPCS | Performed by: PHYSICIAN ASSISTANT

## 2024-01-08 PROCEDURE — G8427 DOCREV CUR MEDS BY ELIG CLIN: HCPCS | Performed by: PHYSICIAN ASSISTANT

## 2024-01-08 PROCEDURE — 4004F PT TOBACCO SCREEN RCVD TLK: CPT | Performed by: PHYSICIAN ASSISTANT

## 2024-01-08 PROCEDURE — G8419 CALC BMI OUT NRM PARAM NOF/U: HCPCS | Performed by: PHYSICIAN ASSISTANT

## 2024-01-08 PROCEDURE — 3017F COLORECTAL CA SCREEN DOC REV: CPT | Performed by: PHYSICIAN ASSISTANT

## 2024-01-08 PROCEDURE — 99202 OFFICE O/P NEW SF 15 MIN: CPT | Performed by: PHYSICIAN ASSISTANT

## 2024-01-08 NOTE — PROGRESS NOTES
Dermatology Patient Note  [facility], [facility] DERMATOLOGY  [address]  [address]  [place] [address]  Dept: [phone]  Dept Fax: [phone]      VISITDATE: 1/8/2024   REFERRING PROVIDER: Srinivasa Soliz MD      Cuco Crandall is a 63 y.o. male  who presents today in the office for:    Mole (Mole on upper eyelids, under eyes, and cheeks. Lt eye lid onset approx 10 yrs. Other moles has had for yrs. ///////////////////////)      HISTORY OF PRESENT ILLNESS:  New patient presents for moles on his face. He notes they are located on his upper eyelids, under his eyes, and cheeks. The one on his left eyelid has been presents for 10 years.  These lesions are very irritating at times, often snagging on shirts, when removing.    MEDICAL PROBLEMS:  Patient Active Problem List    Diagnosis Date Noted    Smoker 10/15/2018    FHx: colon cancer 10/15/2018       CURRENT MEDICATIONS:   Current Outpatient Medications   Medication Sig Dispense Refill    atorvastatin (LIPITOR) 20 MG tablet Take 1 tablet by mouth daily 30 tablet 3     No current facility-administered medications for this visit.       ALLERGIES:   Allergies   Allergen Reactions    Lidoderm [Lidocaine]        SOCIAL HISTORY:  Social History     Tobacco Use    Smoking status: Every Day     Current packs/day: 1.50     Average packs/day: 1.5 packs/day for 30.0 years (45.0 ttl pk-yrs)     Types: Cigarettes    Smokeless tobacco: Never   Substance Use Topics    Alcohol use: Yes     Alcohol/week: 1.0 standard drink of alcohol     Types: 1 Cans of beer per week     Comment: 1 daily        Pertinent ROS:  Review of Systems  Skin: Denies any new changing, growing or bleeding lesions or rashes except as described in the HPI   Constitutional: Denies fevers, chills, and malaise.    PHYSICAL EXAM:   /74   Pulse 76   Temp 97.9 °F (36.6 °C)   Ht 1.829 m (6')   Wt 86.2 kg (190 lb)   SpO2 97%   BMI 25.77 kg/m²

## 2024-02-21 ENCOUNTER — PROCEDURE VISIT (OUTPATIENT)
Dept: DERMATOLOGY | Age: 64
End: 2024-02-21
Payer: COMMERCIAL

## 2024-02-21 VITALS — SYSTOLIC BLOOD PRESSURE: 121 MMHG | OXYGEN SATURATION: 95 % | HEART RATE: 75 BPM | DIASTOLIC BLOOD PRESSURE: 78 MMHG

## 2024-02-21 DIAGNOSIS — L91.8 INFLAMED ACROCHORDON: Primary | ICD-10-CM

## 2024-02-21 PROCEDURE — 11200 RMVL SKIN TAGS UP TO&INC 15: CPT | Performed by: PHYSICIAN ASSISTANT

## 2024-02-21 NOTE — PROGRESS NOTES
Dermatology Surgery Pre-Visit Checklist    (Please complete with  Y (yes) / N (no) or explain)    Pathologic Diagnosis: skin tags edilson eyelids     Photo available of surgery site in media: no    Competent to give informed consent? yes   If not, who is authorized to do so: no    Need for help for wound care: no   If so, who will do so: no    Are you diagnosed:   Diabetes: no   If yes, last A1C: NA       HIV: no       Hepatitis: no       Current smoker: yes  If yes, how much: a pack    So you have any of the following: Pacemaker: no       Defibrillator: no       Artificial Heart valve: no                Artificial Joints: no       Other Implantable Device: no       Organ Transplant: no       Other: NA    Are you on blood thinners such as: Asprin: no       Warfarin/Coumadin: no       Other: no    Any allergies to the following:  Lidocaine: yes       Iodine: no       Adhesive: no       Other: no

## 2024-02-21 NOTE — PROGRESS NOTES
Dermatology Procedure Note   [facility], [facility] DERMATOLOGY  [address]  [address]  [facility] [place] [facility] [address]  Dept: [phone]  Dept Fax: [phone]      Procedure Date: 2/21/2024  Procedure Time: 12:38 PM    Procedure Practitioner: Chang Ceja PA-C    Procedure: Skin Tag Removal    Pre-Procedure Diagnosis: Inflamed acrochorda    Post-Procedure Diagnosis: Same as Pre-Procedure Diagnosis    Informed Consent: The procedure and its risks were explained including but not limited to pain, bleeding, infection, permanent scar, permanent pigment alteration and recurrence. Consent to proceed with the procedure was obtained from the patient or the parent by the practitioner    Time Out:  A time out was conducted immediately before starting the procedure that confirmed a final verification of the correct patient, correct procedure, and correct site.    Procedure Details:  After cleaning with alcohol and anesthetizing with saline, 2 irritated and inflamed skin tag in the left upper eyelid and right medial cheek was removed with iris scissors. Hemostasis was achieved with pressure and aluminum chloride. Vaseline and a bandage were applied.     Procedure Performed By: Chang Ceja PA-C    Estimated Blood Loss: Minimal    Pathologic Specimen: none sent    Procedure Tolerance: Good    Complication(s): None    Electronically signed by Chang Ceja PA-C on 2/21/24 at 12:38 PM EST

## 2024-12-05 NOTE — PROGRESS NOTES
diaphoresis. HENT: Negative for congestion, dental problem, ear discharge, facial swelling and hearing loss. Papiloma present on Left eyelid    Respiratory: Negative for apnea, cough, chest tightness, shortness of breath and wheezing. Cardiovascular: Negative for chest pain and leg swelling. Gastrointestinal: Negative for abdominal distention, abdominal pain, constipation and diarrhea. Genitourinary: Negative for difficulty urinating, dysuria, enuresis, flank pain and frequency. Musculoskeletal: Negative for arthralgias, back pain, gait problem and joint swelling. Skin: Negative for color change, pallor and rash. Neurological: Negative for dizziness, seizures, facial asymmetry, light-headedness, numbness and headaches. Psychiatric/Behavioral: Negative for agitation, behavioral problems, confusion, decreased concentration and dysphoric mood. Objective:   Physical Exam   Constitutional: He is oriented to person, place, and time. He appears well-developed and well-nourished. No distress. HENT:   Head: Normocephalic and atraumatic. Mouth/Throat: No oropharyngeal exudate. Eyes: Pupils are equal, round, and reactive to light. Conjunctivae are normal. Right eye exhibits no discharge. Left eye exhibits no discharge. No scleral icterus. Neck: Normal range of motion. Neck supple. No JVD present. No tracheal deviation present. No thyromegaly present. Cardiovascular: Normal rate and normal heart sounds. Exam reveals no gallop. No murmur heard. Pulmonary/Chest: Effort normal and breath sounds normal. No stridor. No respiratory distress. He has no wheezes. He has no rales. Abdominal: Soft. Bowel sounds are normal. He exhibits no distension. There is no tenderness. There is no rebound and no guarding. Musculoskeletal: Normal range of motion. He exhibits no edema or tenderness. Neurological: He is alert and oriented to person, place, and time. Skin: Skin is warm and dry.  No rash
IV discontinued, cath removed intact